# Patient Record
Sex: MALE | Race: BLACK OR AFRICAN AMERICAN | URBAN - METROPOLITAN AREA
[De-identification: names, ages, dates, MRNs, and addresses within clinical notes are randomized per-mention and may not be internally consistent; named-entity substitution may affect disease eponyms.]

---

## 2017-03-23 ENCOUNTER — HISTORICAL (OUTPATIENT)
Dept: LAB | Facility: HOSPITAL | Age: 67
End: 2017-03-23

## 2018-04-23 ENCOUNTER — HISTORICAL (OUTPATIENT)
Dept: LAB | Facility: HOSPITAL | Age: 68
End: 2018-04-23

## 2018-08-08 ENCOUNTER — HISTORICAL (OUTPATIENT)
Dept: RADIOLOGY | Facility: HOSPITAL | Age: 68
End: 2018-08-08

## 2018-08-15 ENCOUNTER — HISTORICAL (OUTPATIENT)
Dept: RADIOLOGY | Facility: HOSPITAL | Age: 68
End: 2018-08-15

## 2018-09-05 ENCOUNTER — HISTORICAL (OUTPATIENT)
Dept: RADIOLOGY | Facility: HOSPITAL | Age: 68
End: 2018-09-05

## 2018-10-08 ENCOUNTER — HISTORICAL (OUTPATIENT)
Dept: RADIOLOGY | Facility: HOSPITAL | Age: 68
End: 2018-10-08

## 2019-02-25 ENCOUNTER — HISTORICAL (OUTPATIENT)
Dept: LAB | Facility: HOSPITAL | Age: 69
End: 2019-02-25

## 2019-06-25 ENCOUNTER — HISTORICAL (OUTPATIENT)
Dept: LAB | Facility: HOSPITAL | Age: 69
End: 2019-06-25

## 2019-06-25 LAB
ABS NEUT (OLG): 3.75 X10(3)/MCL (ref 2.1–9.2)
ALBUMIN SERPL-MCNC: 3.6 GM/DL (ref 3.4–5)
ALP SERPL-CCNC: 116 UNIT/L (ref 46–116)
AST SERPL-CCNC: 19 UNIT/L (ref 15–37)
BASOPHILS # BLD AUTO: 0 X10(3)/MCL (ref 0–0.2)
BASOPHILS NFR BLD AUTO: 1 %
BILIRUB SERPL-MCNC: 0.4 MG/DL (ref 0.2–1)
BILIRUBIN DIRECT+TOT PNL SERPL-MCNC: 0.09 MG/DL (ref 0–0.2)
BILIRUBIN DIRECT+TOT PNL SERPL-MCNC: 0.31 MG/DL (ref 0–0.8)
BUN SERPL-MCNC: 16.4 MG/DL (ref 7–18)
CALCIUM SERPL-MCNC: 9.1 MG/DL (ref 8.5–10.1)
CHLORIDE SERPL-SCNC: 101 MMOL/L (ref 98–107)
CHOLEST SERPL-MCNC: 162 MG/DL (ref 0–200)
CHOLEST/HDLC SERPL: 4.6 {RATIO} (ref 0–5)
CO2 SERPL-SCNC: 28.3 MMOL/L (ref 21–32)
CREAT SERPL-MCNC: 1.39 MG/DL (ref 0.6–1.3)
EOSINOPHIL # BLD AUTO: 0.2 X10(3)/MCL (ref 0–0.9)
EOSINOPHIL NFR BLD AUTO: 2 %
ERYTHROCYTE [DISTWIDTH] IN BLOOD BY AUTOMATED COUNT: 14.5 % (ref 11.5–17)
GLOBULIN SER-MCNC: 4.5 GM/DL (ref 2.4–3.5)
GLUCOSE SERPL-MCNC: 98 MG/DL (ref 74–106)
HCT VFR BLD AUTO: 37.8 % (ref 42–52)
HDLC SERPL-MCNC: 35 MG/DL (ref 40–60)
HGB BLD-MCNC: 13 GM/DL (ref 14–18)
IMM GRANULOCYTES # BLD AUTO: 0.01 % (ref 0–0.02)
IMM GRANULOCYTES NFR BLD AUTO: 0.2 % (ref 0–0.43)
LDLC SERPL CALC-MCNC: 113 MG/DL (ref 0–129)
LYMPHOCYTES # BLD AUTO: 1.9 X10(3)/MCL (ref 0.6–4.6)
LYMPHOCYTES NFR BLD AUTO: 30 %
MCH RBC QN AUTO: 29.3 PG (ref 27–31)
MCHC RBC AUTO-ENTMCNC: 34.4 GM/DL (ref 33–36)
MCV RBC AUTO: 85.1 FL (ref 80–94)
MONOCYTES # BLD AUTO: 0.6 X10(3)/MCL (ref 0.1–1.3)
MONOCYTES NFR BLD AUTO: 10 %
NEUTROPHILS # BLD AUTO: 3.75 X10(3)/MCL (ref 1.4–7.9)
NEUTROPHILS NFR BLD AUTO: 58 %
PLATELET # BLD AUTO: 142 X10(3)/MCL (ref 130–400)
PLATELET # BLD EST: ADEQUATE 10*3/UL
PMV BLD AUTO: 11.4 FL (ref 9.4–12.4)
POTASSIUM SERPL-SCNC: 3.4 MMOL/L (ref 3.5–5.1)
PROT SERPL-MCNC: 8.1 GM/DL (ref 6.4–8.2)
RBC # BLD AUTO: 4.44 X10(6)/MCL (ref 4.7–6.1)
RBC MORPH BLD: NORMAL
SODIUM SERPL-SCNC: 138 MMOL/L (ref 136–145)
TRIGL SERPL-MCNC: 71 MG/DL
VLDLC SERPL CALC-MCNC: 14 MG/DL
WBC # SPEC AUTO: 6.5 X10(3)/MCL (ref 4.5–11.5)

## 2019-09-16 LAB — NONINV COLON CA DNA+OCC BLD SCRN STL QL: NEGATIVE

## 2020-03-03 ENCOUNTER — HISTORICAL (OUTPATIENT)
Dept: LAB | Facility: HOSPITAL | Age: 70
End: 2020-03-03

## 2020-08-24 ENCOUNTER — HISTORICAL (OUTPATIENT)
Dept: LAB | Facility: HOSPITAL | Age: 70
End: 2020-08-24

## 2020-09-14 ENCOUNTER — HISTORICAL (OUTPATIENT)
Dept: LAB | Facility: HOSPITAL | Age: 70
End: 2020-09-14

## 2021-03-15 ENCOUNTER — HISTORICAL (OUTPATIENT)
Dept: LAB | Facility: HOSPITAL | Age: 71
End: 2021-03-15

## 2021-03-15 LAB
ALBUMIN SERPL-MCNC: 3.5 GM/DL (ref 3.4–4.8)
ALBUMIN/GLOB SERPL: 0.9 RATIO (ref 1.1–2)
ALP SERPL-CCNC: 88 UNIT/L (ref 40–150)
ALT SERPL-CCNC: 20 UNIT/L (ref 0–55)
AST SERPL-CCNC: 19 UNIT/L (ref 5–34)
BILIRUB SERPL-MCNC: 0.4 MG/DL
BILIRUBIN DIRECT+TOT PNL SERPL-MCNC: 0.1 MG/DL (ref 0–0.5)
BILIRUBIN DIRECT+TOT PNL SERPL-MCNC: 0.3 MG/DL (ref 0–0.8)
BUN SERPL-MCNC: 14 MG/DL (ref 8.4–25.7)
CALCIUM SERPL-MCNC: 8.7 MG/DL (ref 8.8–10)
CHLORIDE SERPL-SCNC: 104 MMOL/L (ref 98–107)
CO2 SERPL-SCNC: 26 MMOL/L (ref 23–31)
CREAT SERPL-MCNC: 1.1 MG/DL (ref 0.73–1.18)
ERYTHROCYTE [DISTWIDTH] IN BLOOD BY AUTOMATED COUNT: 14.7 % (ref 11.5–17)
GLOBULIN SER-MCNC: 3.8 GM/DL (ref 2.4–3.5)
GLUCOSE SERPL-MCNC: 107 MG/DL (ref 82–115)
HCT VFR BLD AUTO: 38.1 % (ref 42–52)
HGB BLD-MCNC: 12.2 GM/DL (ref 14–18)
MCH RBC QN AUTO: 29.7 PG (ref 27–31)
MCHC RBC AUTO-ENTMCNC: 32 GM/DL (ref 33–36)
MCV RBC AUTO: 92.7 FL (ref 80–94)
PLATELET # BLD AUTO: 228 X10(3)/MCL (ref 130–400)
PMV BLD AUTO: 11.7 FL (ref 9.4–12.4)
POTASSIUM SERPL-SCNC: 3.1 MMOL/L (ref 3.5–5.1)
PROT SERPL-MCNC: 7.3 GM/DL (ref 5.8–7.6)
RBC # BLD AUTO: 4.11 X10(6)/MCL (ref 4.7–6.1)
SODIUM SERPL-SCNC: 142 MMOL/L (ref 136–145)
WBC # SPEC AUTO: 8.6 X10(3)/MCL (ref 4.5–11.5)

## 2022-04-07 ENCOUNTER — HISTORICAL (OUTPATIENT)
Dept: ADMINISTRATIVE | Facility: HOSPITAL | Age: 72
End: 2022-04-07
Payer: MEDICARE

## 2022-04-24 VITALS
WEIGHT: 190.56 LBS | DIASTOLIC BLOOD PRESSURE: 79 MMHG | BODY MASS INDEX: 28.88 KG/M2 | OXYGEN SATURATION: 97 % | HEIGHT: 68 IN | SYSTOLIC BLOOD PRESSURE: 145 MMHG

## 2022-05-23 ENCOUNTER — OFFICE VISIT (OUTPATIENT)
Dept: PRIMARY CARE CLINIC | Facility: CLINIC | Age: 72
End: 2022-05-23
Payer: MEDICARE

## 2022-05-23 VITALS
OXYGEN SATURATION: 98 % | BODY MASS INDEX: 33.46 KG/M2 | RESPIRATION RATE: 18 BRPM | HEIGHT: 64 IN | DIASTOLIC BLOOD PRESSURE: 81 MMHG | SYSTOLIC BLOOD PRESSURE: 144 MMHG | HEART RATE: 57 BPM | WEIGHT: 196 LBS | TEMPERATURE: 97 F

## 2022-05-23 DIAGNOSIS — I10 UNCONTROLLED HYPERTENSION: Primary | ICD-10-CM

## 2022-05-23 DIAGNOSIS — R54 FRAIL ELDERLY: ICD-10-CM

## 2022-05-23 DIAGNOSIS — R47.1 DYSARTHRIA: ICD-10-CM

## 2022-05-23 DIAGNOSIS — E87.6 HYPOKALEMIA: ICD-10-CM

## 2022-05-23 DIAGNOSIS — Z55.0 ILLITERACY: ICD-10-CM

## 2022-05-23 DIAGNOSIS — I69.952 SPASTIC HEMIPARESIS OF LEFT DOMINANT SIDE AS LATE EFFECT OF CEREBROVASCULAR DISEASE: ICD-10-CM

## 2022-05-23 PROBLEM — G81.10 SPASTIC HEMIPLEGIA: Status: ACTIVE | Noted: 2022-05-23

## 2022-05-23 PROBLEM — N18.9 CHRONIC RENAL IMPAIRMENT: Status: ACTIVE | Noted: 2022-05-23

## 2022-05-23 PROBLEM — Z72.0 CHEWS TOBACCO: Status: ACTIVE | Noted: 2022-05-23

## 2022-05-23 PROBLEM — Z91.199 NONCOMPLIANCE WITH TREATMENT REGIMEN: Status: ACTIVE | Noted: 2022-05-23

## 2022-05-23 PROBLEM — R63.4 ABNORMAL WEIGHT LOSS: Status: ACTIVE | Noted: 2022-05-23

## 2022-05-23 PROCEDURE — 80048 BASIC METABOLIC PNL TOTAL CA: CPT | Performed by: INTERNAL MEDICINE

## 2022-05-23 PROCEDURE — 99213 OFFICE O/P EST LOW 20 MIN: CPT | Mod: ,,, | Performed by: INTERNAL MEDICINE

## 2022-05-23 PROCEDURE — 99213 PR OFFICE/OUTPT VISIT, EST, LEVL III, 20-29 MIN: ICD-10-PCS | Mod: ,,, | Performed by: INTERNAL MEDICINE

## 2022-05-23 PROCEDURE — 36415 COLL VENOUS BLD VENIPUNCTURE: CPT | Performed by: INTERNAL MEDICINE

## 2022-05-23 RX ORDER — POTASSIUM CHLORIDE 20 MEQ/15ML
20 SOLUTION ORAL 2 TIMES DAILY
Qty: 900 ML | Refills: 2 | Status: SHIPPED | OUTPATIENT
Start: 2022-05-23 | End: 2022-08-21

## 2022-05-23 RX ORDER — HYDROCHLOROTHIAZIDE 25 MG/1
25 TABLET ORAL DAILY
COMMUNITY
Start: 2021-12-08 | End: 2022-11-16 | Stop reason: SDUPTHER

## 2022-05-23 RX ORDER — MONTELUKAST SODIUM 10 MG/1
10 TABLET ORAL DAILY
COMMUNITY
Start: 2021-12-08 | End: 2023-01-12

## 2022-05-23 RX ORDER — POTASSIUM CHLORIDE 20 MEQ/1
20 TABLET, EXTENDED RELEASE ORAL SEE ADMIN INSTRUCTIONS
COMMUNITY
End: 2022-05-23 | Stop reason: SDDI

## 2022-05-23 RX ORDER — ALBUTEROL SULFATE 90 UG/1
AEROSOL, METERED RESPIRATORY (INHALATION)
COMMUNITY
Start: 2021-12-08 | End: 2023-04-13 | Stop reason: ALTCHOICE

## 2022-05-23 RX ORDER — CARVEDILOL 12.5 MG/1
12.5 TABLET ORAL 2 TIMES DAILY WITH MEALS
COMMUNITY
End: 2022-11-16 | Stop reason: SDUPTHER

## 2022-05-23 RX ORDER — PRAVASTATIN SODIUM 20 MG/1
20 TABLET ORAL DAILY
COMMUNITY
Start: 2021-12-08 | End: 2023-04-13 | Stop reason: SDUPTHER

## 2022-05-23 RX ORDER — AMLODIPINE BESYLATE 10 MG/1
10 TABLET ORAL DAILY
COMMUNITY
Start: 2021-12-08 | End: 2023-03-01

## 2022-05-23 SDOH — SOCIAL DETERMINANTS OF HEALTH (SDOH): ILITERACY AND LOW LEVEL LITERACY: Z55.0

## 2022-05-23 NOTE — PROGRESS NOTES
Rosemary Arnold MD   [address] [place] [address]     PATIENT NAME: Cody Agustin  : 1950  DATE: 22  MRN: [id number]      Billing Provider: Rosemary Arnold MD  Level of Service:   Patient PCP Information     Provider PCP Type    Rosemary Arnold MD General          Reason for Visit / Chief Complaint: Follow-up       Update PCP  Update Chief Complaint         History of Present Illness / Problem Focused Workflow     Cody Agustin presents to the clinic with Follow-up     72 year old with HTN, CVA with left hemiparesis, CKD2-3 and illiteracy. He reports he is doing fine. He wants his K changed, the pills are too big, he reports he takes it once a day, rarely twice although it's ordered TID MWF and BID the other days. He reports doing his booster for Covid.       Review of Systems     Review of Systems   Constitutional: Negative.    HENT: Negative.    Respiratory: Negative.    Cardiovascular: Negative.    Endocrine: Negative.    Genitourinary: Negative.    Neurological: Positive for speech difficulty and weakness. Negative for dizziness, light-headedness and headaches.        Fell last week, just tripped, denies pain       Medical / Social / Family History     Past Medical History:   Diagnosis Date    Chronic renal failure     CVA (cerebral vascular accident)     HTN (hypertension)     Illiterate     Left spastic hemiplegia        Past Surgical History:   Procedure Laterality Date    FOOT ARTHRODESIS W/ OLGA-BRANCHEAU ARTHROEREISIS IMPLANT Right 2015       Social History  Mr. Agustin      reports that he has never smoked. His smokeless tobacco use includes snuff.    Family History  Mr.'s Agustin   family history includes Diabetes in his sister; Heart attack in his father; Heart failure in his brother, brother, and brother; Hypertension in his brother; Kidney failure in his sister; Stroke in his brother and mother.    Medications and Allergies     Medications  Outpatient Medications Marked as  Taking for the 5/23/22 encounter (Office Visit) with Rosemary Arnold MD   Medication Sig Dispense Refill    albuterol (PROVENTIL/VENTOLIN HFA) 90 mcg/actuation inhaler Inhale into the lungs.      amLODIPine (NORVASC) 10 MG tablet Take 10 mg by mouth once daily.      carvediloL (COREG) 12.5 MG tablet Take 12.5 mg by mouth 2 (two) times daily with meals.      hydroCHLOROthiazide (HYDRODIURIL) 25 MG tablet Take 25 mg by mouth once daily.      montelukast (SINGULAIR) 10 mg tablet Take 10 mg by mouth once daily.      pravastatin (PRAVACHOL) 20 MG tablet Take 20 mg by mouth once daily at 6am.      [DISCONTINUED] potassium chloride SA (K-DUR,KLOR-CON) 20 MEQ tablet Take 20 mEq by mouth As instructed. TID on MWF, BID on other days         Allergies  Review of patient's allergies indicates:  No Known Allergies    Physical Examination     Vitals:    05/23/22 1439   BP: (!) 144/81   Pulse: (!) 57   Resp: 18   Temp: 97.2 °F (36.2 °C)     Physical Exam  Vitals reviewed.   HENT:      Head: Normocephalic.      Right Ear: Tympanic membrane normal.      Left Ear: Tympanic membrane normal.   Eyes:      Conjunctiva/sclera: Conjunctivae normal.      Pupils: Pupils are equal, round, and reactive to light.   Cardiovascular:      Rate and Rhythm: Regular rhythm. Bradycardia present.      Heart sounds: Normal heart sounds.     No gallop.   Pulmonary:      Effort: Pulmonary effort is normal.      Breath sounds: Normal breath sounds.   Chest:      Chest wall: No tenderness.   Abdominal:      General: Abdomen is flat.      Palpations: Abdomen is soft.      Tenderness: There is no abdominal tenderness. There is no rebound.   Musculoskeletal:         General: Deformity present.      Cervical back: Neck supple. No tenderness.      Comments: Contracture left wrist and elbow   Skin:     General: Skin is warm and dry.   Neurological:      Mental Status: He is alert and oriented to person, place, and time.      Comments: Speech stable, there  is dense spastic left arm hemiparesis, left leg partial paralysis, walking with 4prong cane.            Assessment and Plan (including Health Maintenance)      Problem List  Smart Sets  Document Outside HM   :    Plan:         Health Maintenance Due   Topic Date Due    Hepatitis C Screening  Never done    TETANUS VACCINE  Never done    Colorectal Cancer Screening  Never done    Shingles Vaccine (1 of 2) Never done    Abdominal Aortic Aneurysm Screening  Never done    COVID-19 Vaccine (3 - Booster for Moderna series) 09/27/2021       Problem List Items Addressed This Visit        Renal/    Hypokalemia    Relevant Medications    potassium chloride 10% (KAYCIEL) 20 mEq/15 mL oral solution    Other Relevant Orders    Basic Metabolic Panel      Other Visit Diagnoses     Uncontrolled hypertension    -  Primary    Medical compliance is an issue and chronically hypokalemic affecting medications return 3 mo to see if liquid K gets him to normal levels.     Relevant Medications    potassium chloride 10% (KAYCIEL) 20 mEq/15 mL oral solution    Other Relevant Orders    Basic Metabolic Panel    Spastic hemiparesis of left dominant side as late effect of cerebrovascular disease        Does remarkably well for level of paralysis he has    Illiteracy        Frail elderly        Dysarthria              Health Maintenance Topics with due status: Not Due       Topic Last Completion Date    Lipid Panel 06/25/2019   Lipid was done in Dec, Cer[employer] merge did not happen    No future appointments.         Signature:  Rosemary Arnold MD  [facility]  [id number] [place] [id number]    Date of encounter: 5/23/22

## 2022-05-24 ENCOUNTER — TELEPHONE (OUTPATIENT)
Dept: PRIMARY CARE CLINIC | Facility: CLINIC | Age: 72
End: 2022-05-24
Payer: MEDICARE

## 2022-05-24 LAB
ANION GAP SERPL CALC-SCNC: 10 MEQ/L
BUN SERPL-MCNC: 22.7 MG/DL (ref 8.4–25.7)
CALCIUM SERPL-MCNC: 8.7 MG/DL (ref 8.8–10)
CHLORIDE SERPL-SCNC: 102 MMOL/L (ref 98–107)
CO2 SERPL-SCNC: 24 MMOL/L (ref 23–31)
CREAT SERPL-MCNC: 1.18 MG/DL (ref 0.73–1.18)
CREAT/UREA NIT SERPL: 19
GLUCOSE SERPL-MCNC: 107 MG/DL (ref 82–115)
POTASSIUM SERPL-SCNC: 4 MMOL/L (ref 3.5–5.1)
SODIUM SERPL-SCNC: 136 MMOL/L (ref 136–145)

## 2022-05-24 NOTE — TELEPHONE ENCOUNTER
----- Message from Rosemary Arnold MD sent at 5/24/2022  1:14 PM CDT -----  His potassium was ok and he's not usually taking the pill more than once a day so if he takes the liquid once a day he is probably ok.  His sugar was good and kidney was too.  The calcium is low so drink more milk.

## 2022-08-02 PROBLEM — I10 ESSENTIAL HYPERTENSION: Status: ACTIVE | Noted: 2022-08-02

## 2022-08-08 PROBLEM — Z55.0 ILLITERACY: Status: ACTIVE | Noted: 2022-08-08

## 2022-08-08 PROBLEM — R60.0 EDEMA LEG: Status: ACTIVE | Noted: 2022-08-08

## 2022-08-08 PROBLEM — N18.31 STAGE 3A CHRONIC KIDNEY DISEASE: Status: ACTIVE | Noted: 2022-08-08

## 2022-08-08 PROBLEM — I69.354 HEMIPARESIS OF LEFT NONDOMINANT SIDE AS LATE EFFECT OF CEREBRAL INFARCTION: Status: ACTIVE | Noted: 2022-08-08

## 2022-08-22 ENCOUNTER — HOSPITAL ENCOUNTER (EMERGENCY)
Facility: HOSPITAL | Age: 72
Discharge: HOME | End: 2022-08-22
Attending: EMERGENCY MEDICINE
Payer: MEDICARE

## 2022-08-22 VITALS
DIASTOLIC BLOOD PRESSURE: 92 MMHG | RESPIRATION RATE: 18 BRPM | OXYGEN SATURATION: 99 % | HEIGHT: 64 IN | BODY MASS INDEX: 32.78 KG/M2 | WEIGHT: 192 LBS | HEART RATE: 59 BPM | TEMPERATURE: 98 F | SYSTOLIC BLOOD PRESSURE: 180 MMHG

## 2022-08-22 DIAGNOSIS — G60.9 IDIOPATHIC PERIPHERAL NEUROPATHY: Primary | ICD-10-CM

## 2022-08-22 PROCEDURE — 99283 EMERGENCY DEPT VISIT LOW MDM: CPT

## 2022-08-22 PROCEDURE — 25000003 PHARM REV CODE 250: Performed by: EMERGENCY MEDICINE

## 2022-08-22 RX ORDER — GABAPENTIN 300 MG/1
300 CAPSULE ORAL ONCE
Status: COMPLETED | OUTPATIENT
Start: 2022-08-22 | End: 2022-08-22

## 2022-08-22 RX ORDER — GABAPENTIN 300 MG/1
300 CAPSULE ORAL 3 TIMES DAILY
Qty: 90 CAPSULE | Refills: 11 | Status: SHIPPED | OUTPATIENT
Start: 2022-08-22 | End: 2022-08-24 | Stop reason: DRUGHIGH

## 2022-08-22 RX ADMIN — GABAPENTIN 300 MG: 300 CAPSULE ORAL at 04:08

## 2022-08-22 NOTE — ED NOTES
Pt arrived to ED with family c/o pain to bottom of left foot. Stated pain is mostly from the arch to heel and rates it 10/10. Pt has hx of stroke that affected the same side, stroke 14 years ago. Has taken Ibuprofen, last dose on Thursday of last week.

## 2022-08-22 NOTE — ED PROVIDER NOTES
Encounter Date: 8/22/2022       History     Chief Complaint   Patient presents with    Foot Pain     Pt presents with tingling sensation to left foot; onset 1wk ago; denies any injury; hx of stroke with left side affected; reports intermittent swelling      This 71 y/o man presents with c/o Tingling sensation in his foot. He cannot bear weight on it and feels like pins and needles when he does.He has had a CVA affecting that side in the past with left hemiplegia. He also has some intermittent swelling of the foot.         Review of patient's allergies indicates:  No Known Allergies  Past Medical History:   Diagnosis Date    Chronic renal failure     CVA (cerebral vascular accident)     HTN (hypertension)     Illiterate     Left spastic hemiplegia      Past Surgical History:   Procedure Laterality Date    FOOT ARTHRODESIS W/ OLGA-KASEY ARTHROEREISIS IMPLANT Right 08/13/2015     Family History   Problem Relation Age of Onset    Stroke Mother     Heart attack Father     Diabetes Sister     Kidney failure Sister     Heart failure Brother     Hypertension Brother     Stroke Brother     Heart failure Brother     Heart failure Brother      Social History     Tobacco Use    Smoking status: Never Smoker    Smokeless tobacco: Current User     Types: Snuff     Review of Systems   Constitutional: Negative for fever.   HENT: Negative for sore throat.    Respiratory: Negative for shortness of breath.    Cardiovascular: Negative for chest pain.   Gastrointestinal: Negative for nausea.   Genitourinary: Negative for dysuria.   Musculoskeletal: Negative for back pain.   Skin: Negative for rash.   Neurological: Negative for weakness.        Paresthesias in the left foot   Hematological: Does not bruise/bleed easily.       Physical Exam     Initial Vitals [08/22/22 1539]   BP Pulse Resp Temp SpO2   (!) 180/92 (!) 59 18 98.3 °F (36.8 °C) 99 %      MAP       --         Physical Exam    Constitutional: He appears  well-developed and well-nourished.   HENT:   Head: Normocephalic and atraumatic.   Mouth/Throat: Mucous membranes are normal.   Eyes: EOM are normal. Pupils are equal, round, and reactive to light.   Neck: Neck supple.   Normal range of motion.  Cardiovascular: Normal rate, regular rhythm, normal heart sounds and intact distal pulses.   Pulmonary/Chest: Breath sounds normal.   Abdominal: Abdomen is soft. Bowel sounds are normal.   Musculoskeletal:         General: Normal range of motion.      Cervical back: Normal range of motion and neck supple.     Neurological: He is alert and oriented to person, place, and time.   Left hemiplegia   Skin: Skin is warm and dry. Capillary refill takes less than 2 seconds.   The toenails need trimming but there is no sign of infection or arthropathy.    Psychiatric: He has a normal mood and affect. His behavior is normal. Judgment and thought content normal.         ED Course   Procedures  Labs Reviewed - No data to display       Imaging Results    None          Medications - No data to display                       Clinical Impression:   Final diagnoses:  [G60.9] Idiopathic peripheral neuropathy (Primary)          ED Disposition Condition    Discharge Stable        ED Prescriptions     Medication Sig Dispense Start Date End Date Auth. Provider    gabapentin (NEURONTIN) 300 MG capsule Take 1 capsule (300 mg total) by mouth 3 (three) times daily. 90 capsule 8/22/2022 8/22/2023 Isidro Martínez MD        Follow-up Information     Follow up With Specialties Details Why Contact Info    Rosemary Arnold MD Internal Medicine  keep appointment scheduled in 2 days. [address] [facility] [address]  [phone]             Isidro Martínez MD  08/22/22 2250

## 2022-08-24 ENCOUNTER — OFFICE VISIT (OUTPATIENT)
Dept: PRIMARY CARE CLINIC | Facility: CLINIC | Age: 72
End: 2022-08-24
Payer: MEDICARE

## 2022-08-24 ENCOUNTER — TELEPHONE (OUTPATIENT)
Dept: PRIMARY CARE CLINIC | Facility: CLINIC | Age: 72
End: 2022-08-24

## 2022-08-24 VITALS
BODY MASS INDEX: 39.27 KG/M2 | WEIGHT: 200 LBS | OXYGEN SATURATION: 97 % | RESPIRATION RATE: 16 BRPM | DIASTOLIC BLOOD PRESSURE: 71 MMHG | HEART RATE: 63 BPM | TEMPERATURE: 98 F | SYSTOLIC BLOOD PRESSURE: 131 MMHG | HEIGHT: 60 IN

## 2022-08-24 DIAGNOSIS — M79.672 LEFT FOOT PAIN: Primary | ICD-10-CM

## 2022-08-24 DIAGNOSIS — R54 FRAIL ELDERLY: ICD-10-CM

## 2022-08-24 DIAGNOSIS — I69.922 DYSARTHRIA AS LATE EFFECT OF CEREBROVASCULAR DISEASE: ICD-10-CM

## 2022-08-24 DIAGNOSIS — R60.0 PEDAL EDEMA: ICD-10-CM

## 2022-08-24 DIAGNOSIS — Z55.0 ILLITERATE: ICD-10-CM

## 2022-08-24 DIAGNOSIS — I69.354 SPASTIC HEMIPARESIS OF LEFT NONDOMINANT SIDE AS LATE EFFECT OF CEREBRAL INFARCTION: ICD-10-CM

## 2022-08-24 DIAGNOSIS — I10 ESSENTIAL HYPERTENSION: ICD-10-CM

## 2022-08-24 PROCEDURE — 99213 OFFICE O/P EST LOW 20 MIN: CPT | Mod: ,,, | Performed by: INTERNAL MEDICINE

## 2022-08-24 PROCEDURE — 99213 PR OFFICE/OUTPT VISIT, EST, LEVL III, 20-29 MIN: ICD-10-PCS | Mod: ,,, | Performed by: INTERNAL MEDICINE

## 2022-08-24 RX ORDER — GABAPENTIN 600 MG/1
600 TABLET ORAL 3 TIMES DAILY
Qty: 90 TABLET | Refills: 5 | Status: SHIPPED | OUTPATIENT
Start: 2022-08-24 | End: 2023-04-13 | Stop reason: SDUPTHER

## 2022-08-24 SDOH — SOCIAL DETERMINANTS OF HEALTH (SDOH): ILITERACY AND LOW LEVEL LITERACY: Z55.0

## 2022-08-24 NOTE — TELEPHONE ENCOUNTER
----- Message from Ashley Armstrong sent at 8/24/2022  2:37 PM CDT -----  Regarding: Med Advice  Type:  Patient Returning Call    Who Called: pt  Who Left Message for Patient: for nurse  Does the patient know what this is regarding?: yes  Would the patient rather a call back or a response via [facility]? Call back  Best Call Back Number: [phone]  Additional Information: pt's daughter was calling to check on her dad, she was unable to bring him to his appt and wants to know if he was okay. She also wanted to know why he had so many appts and why it was listed at 4:30 but was told to come in at 1:30... and she needed to ask Dr. Arnold to write a prescription to get him a Transport Chair from the [facility] door.. she says she called already and they accept his insurance for it but just needed the sign off...

## 2022-08-24 NOTE — PROGRESS NOTES
Rosemary Arnold MD   [address] [place] [address]     PATIENT NAME: Cody Agustin  : 1950  DATE: 22  MRN: [id number]      Billing Provider: Rosemary Arnold MD  Level of Service:   Patient PCP Information     Provider PCP Type    Rosemary Arnold MD General          Reason for Visit / Chief Complaint: ER follow up for laeft foot pain       Update PCP  Update Chief Complaint         History of Present Illness / Problem Focused Workflow     Cody Agustin presents to the clinic with ER follow up for laeft foot pain     He is here to follow up his ER visit with foot pain. He notes he got a new Medicare Card, he has had multiple  in our system and in pharmacy He says he is not 72 he is 71 although I believe our  was through his Medicare. He shows me a new card, it is from [employer] now. He went to the ER with foot/leg pain and was diagnosed with neuropathy. He was given gabapentin. The pain is sticking pain under his foot. He is also having pain in his left ankle. The gabapentin has helped some but he thinks it needs to be stronger. He is not tired with it and no reports of falls. He is also scheduled to see the cardiologist this week. I had ordered an ECHO on his visit last year but I don't see any reports here or in the old chart. He also had some chest pain reported and SOB with exertion but had missed his appointment with [facility]. He also missed two follow up visits from the ER here.   He is having more trouble walking, he has the spastic hemiparesis on the left and uses a quad cane but he's tiring quickly now and gets winded. His family is asking for a transport chair to assist in getting him to his appointments. With his left arm hemiparesis he cannot use a standard and he has dyspnea with exertion that is under evaluation with cardiology.       Review of Systems     Review of Systems   Constitutional: Positive for fatigue.   Respiratory: Positive for shortness of breath.    Cardiovascular: Positive  for leg swelling.   Gastrointestinal: Negative.    Genitourinary: Negative.    Musculoskeletal: Positive for arthralgias, gait problem, joint swelling and myalgias.        Mostly his left leg that had weakness already from his stroke.   Neurological: Positive for weakness.        Left leg is weak.        Medical / Social / Family History     Past Medical History:   Diagnosis Date    Chronic renal failure     CVA (cerebral vascular accident)     HTN (hypertension)     Illiterate     Left spastic hemiplegia        Past Surgical History:   Procedure Laterality Date    FOOT ARTHRODESIS W/ OLGA-BRANCHSEKOUU ARTHROEREISIS IMPLANT Right 08/13/2015       Social History  Mr. Agustin      reports that he has never smoked. His smokeless tobacco use includes snuff.    Family History  Mr.'s Agustin   family history includes Diabetes in his sister; Heart attack in his father; Heart failure in his brother, brother, and brother; Hypertension in his brother; Kidney failure in his sister; Stroke in his brother and mother.    Medications and Allergies     Medications  Outpatient Medications Marked as Taking for the 8/24/22 encounter (Office Visit) with Rosemary Arnold MD   Medication Sig Dispense Refill    amLODIPine (NORVASC) 10 MG tablet Take 10 mg by mouth once daily.      carvediloL (COREG) 12.5 MG tablet Take 12.5 mg by mouth 2 (two) times daily with meals.      hydroCHLOROthiazide (HYDRODIURIL) 25 MG tablet Take 25 mg by mouth once daily.      montelukast (SINGULAIR) 10 mg tablet Take 10 mg by mouth once daily.      potassium chloride (KLOR-CON M20 ORAL) Take 20 mEq by mouth. 1 BID DAILY  WITH EXTRA DOSE 3 TIMES ADAY ON Wednesday AND SATURDAY      pravastatin (PRAVACHOL) 20 MG tablet Take 20 mg by mouth once daily at 6am.      [DISCONTINUED] gabapentin (NEURONTIN) 300 MG capsule Take 1 capsule (300 mg total) by mouth 3 (three) times daily. 90 capsule 11       Allergies  Review of patient's allergies indicates:  No  Known Allergies    Physical Examination     Vitals:    08/24/22 1402   BP: 131/71   Pulse: 63   Resp: 16   Temp: 98.1 °F (36.7 °C)     Physical Exam  Vitals reviewed.   Constitutional:       General: He is not in acute distress.     Appearance: He is ill-appearing.   Cardiovascular:      Rate and Rhythm: Normal rate and regular rhythm.      Heart sounds: Normal heart sounds.      Comments: 3+ edema, pulse is decreased on left foot but it is warm to touch  Abdominal:      General: Bowel sounds are normal.      Palpations: Abdomen is soft.   Musculoskeletal:      Right lower leg: Edema present.      Left lower leg: Edema present.      Comments: L>R, no Fatimah's, 3+ to lower calf   Skin:     Comments: Decreased hair on the calves   Neurological:      Mental Status: He is alert.           Assessment and Plan (including Health Maintenance)      Problem List  Smart Sets  Document Outside HM   :    Plan:   Foot pain, it is his weak leg, will try increasing gabapentin since he's taking regularly and it helps some. He is due to see a heart doctor this week and the pulse is decreased there, hopefully he'll get a full evaluation with carotids and ECHO.  He would benefit from a transport chair to use to move around in house and for doctor appointments outside. He has spastic hemiparesis and the pain in that leg is increasing his difficulty walking, he has a quad cane but with his arm paralysis has not been able to maneuver a walker. He has decreased endurance with dyspnea on exertion that is under evaluation with cardiology.   Foot edema evaluation with cardiology. With past CVA hopefully he'll also get check on carotids.   Decreased pulse may be from edema but I'd like him to check US with cardiology as well.       Health Maintenance Due   Topic Date Due    Hepatitis C Screening  Never done    TETANUS VACCINE  Never done    High Dose Statin  Never done    Colorectal Cancer Screening  Never done    Shingles Vaccine (1 of 2)  Never done    Abdominal Aortic Aneurysm Screening  Never done    COVID-19 Vaccine (4 - Booster for Moderna series) 09/05/2022       Problem List Items Addressed This Visit        Neuro    Spastic hemiparesis of left nondominant side as late effect of cerebral infarction    Relevant Orders    WHEELCHAIR FOR HOME USE       Cardiac/Vascular    Essential hypertension    Relevant Orders    Comprehensive Metabolic Panel    Lipid Panel       Other    Illiterate      Other Visit Diagnoses     Left foot pain    -  Primary    Pedal edema        Relevant Orders    Comprehensive Metabolic Panel    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

## 2022-08-24 NOTE — TELEPHONE ENCOUNTER
I've been seeing him every 3-6 months because his blood pressure was not controlled, this visit was follow up from his ER visit for the foot pain to assess if the medication was working. He also is to see a heart specialist for the shortness of breath and swelling. I have been trying to get that scheduled for over a year but he kept missing his visits. They will need to do testing so I'd expect more visits with them.     I needed to know about the transport chair but I was able to go back and do the documentation so an order should have printed to fax.

## 2022-08-25 ENCOUNTER — TELEPHONE (OUTPATIENT)
Dept: PRIMARY CARE CLINIC | Facility: CLINIC | Age: 72
End: 2022-08-25

## 2022-08-25 NOTE — TELEPHONE ENCOUNTER
----- Message from Odilon Macias sent at 8/25/2022  2:16 PM CDT -----  Regarding: Patient Call  Type:  Patient Returning Call    Who Called: pt  Who Left Message for Patient: nurse  Does the patient know what this is regarding?: yes  Would the patient rather a call back or a response via [facility]?  Call back  Best Call Back Number: [phone]  Additional Information:  [facility] pharmacy returning a phone call to Molly

## 2022-08-25 NOTE — TELEPHONE ENCOUNTER
I'm pretty sure we sent him to [facility] and they take pretty much every insurance, what they were asking for is probably his copay for a procedure, we could refer him to [facility].

## 2022-08-25 NOTE — TELEPHONE ENCOUNTER
----- Message from Ashley Armstrong sent at 8/25/2022 10:52 AM CDT -----  Regarding: Fax Request  Type:  Patient Returning Call    Who Called: pt's daughterDiana  Who Left Message for Patient: for nurse  Does the patient know what this is regarding?: yes  Would the patient rather a call back or a response via [facility]? Call back  Best Call Back Number: [phone]  Additional Information: pt's daughter was calling about the fax for her dad's transport chair.. she called [employer] and they said they never received it.. called backline 2x

## 2022-08-30 ENCOUNTER — DOCUMENTATION ONLY (OUTPATIENT)
Dept: ADMINISTRATIVE | Facility: HOSPITAL | Age: 72
End: 2022-08-30
Payer: MEDICARE

## 2022-09-04 ENCOUNTER — HOSPITAL ENCOUNTER (EMERGENCY)
Facility: HOSPITAL | Age: 72
Discharge: HOME | End: 2022-09-04
Attending: EMERGENCY MEDICINE
Payer: MEDICARE

## 2022-09-04 VITALS
DIASTOLIC BLOOD PRESSURE: 79 MMHG | TEMPERATURE: 98 F | SYSTOLIC BLOOD PRESSURE: 136 MMHG | HEART RATE: 64 BPM | RESPIRATION RATE: 15 BRPM | HEIGHT: 60 IN | OXYGEN SATURATION: 98 % | WEIGHT: 200 LBS | BODY MASS INDEX: 39.27 KG/M2

## 2022-09-04 DIAGNOSIS — R60.0 BILATERAL LOWER EXTREMITY EDEMA: ICD-10-CM

## 2022-09-04 DIAGNOSIS — M79.673 FOOT PAIN: ICD-10-CM

## 2022-09-04 DIAGNOSIS — R60.9 DEPENDENT EDEMA: Primary | ICD-10-CM

## 2022-09-04 DIAGNOSIS — M79.672 LEFT FOOT PAIN: ICD-10-CM

## 2022-09-04 LAB
ALBUMIN SERPL-MCNC: 3.6 GM/DL (ref 3.4–4.8)
ALBUMIN/GLOB SERPL: 0.8 RATIO (ref 1.1–2)
ALP SERPL-CCNC: 107 UNIT/L (ref 40–150)
ALT SERPL-CCNC: 18 UNIT/L (ref 0–55)
AST SERPL-CCNC: 23 UNIT/L (ref 5–34)
BASOPHILS # BLD AUTO: 0.04 X10(3)/MCL (ref 0–0.2)
BASOPHILS NFR BLD AUTO: 0.5 %
BILIRUBIN DIRECT+TOT PNL SERPL-MCNC: 0.5 MG/DL
BNP BLD-MCNC: 36.8 PG/ML
BUN SERPL-MCNC: 21.7 MG/DL (ref 8.4–25.7)
CALCIUM SERPL-MCNC: 9.3 MG/DL (ref 8.8–10)
CHLORIDE SERPL-SCNC: 103 MMOL/L (ref 98–107)
CO2 SERPL-SCNC: 23 MMOL/L (ref 23–31)
CREAT SERPL-MCNC: 1.57 MG/DL (ref 0.73–1.18)
EOSINOPHIL # BLD AUTO: 0.5 X10(3)/MCL (ref 0–0.9)
EOSINOPHIL NFR BLD AUTO: 6.8 %
ERYTHROCYTE [DISTWIDTH] IN BLOOD BY AUTOMATED COUNT: 14.2 % (ref 11.5–17)
FLUAV AG UPPER RESP QL IA.RAPID: NOT DETECTED
FLUBV AG UPPER RESP QL IA.RAPID: NOT DETECTED
GFR SERPLBLD CREATININE-BSD FMLA CKD-EPI: 46 MLS/MIN/1.73/M2
GLOBULIN SER-MCNC: 4.7 GM/DL (ref 2.4–3.5)
GLUCOSE SERPL-MCNC: 88 MG/DL (ref 82–115)
HCT VFR BLD AUTO: 41.6 % (ref 42–52)
HGB BLD-MCNC: 13.6 GM/DL (ref 14–18)
LYMPHOCYTES # BLD AUTO: 1.99 X10(3)/MCL (ref 0.6–4.6)
LYMPHOCYTES NFR BLD AUTO: 27.3 %
MCH RBC QN AUTO: 29.3 PG (ref 27–31)
MCHC RBC AUTO-ENTMCNC: 32.7 MG/DL (ref 33–36)
MCV RBC AUTO: 89.7 FL (ref 80–94)
MONOCYTES # BLD AUTO: 0.65 X10(3)/MCL (ref 0.1–1.3)
MONOCYTES NFR BLD AUTO: 8.9 %
NEUTROPHILS # BLD AUTO: 4.1 X10(3)/MCL (ref 2.1–9.2)
NEUTROPHILS NFR BLD AUTO: 56.4 %
PLATELET # BLD AUTO: 225 X10(3)/MCL (ref 130–400)
PMV BLD AUTO: 11 FL (ref 7.4–10.4)
POC CARDIAC TROPONIN I: 0.06 NG/ML
POTASSIUM SERPL-SCNC: 3.6 MMOL/L (ref 3.5–5.1)
PROT SERPL-MCNC: 8.3 GM/DL (ref 5.8–7.6)
RBC # BLD AUTO: 4.64 X10(6)/MCL (ref 4.7–6.1)
SAMPLE: NORMAL
SARS-COV-2 RNA RESP QL NAA+PROBE: NOT DETECTED
SODIUM SERPL-SCNC: 139 MMOL/L (ref 136–145)
WBC # SPEC AUTO: 7.3 X10(3)/MCL (ref 4.5–11.5)

## 2022-09-04 PROCEDURE — 84484 ASSAY OF TROPONIN QUANT: CPT

## 2022-09-04 PROCEDURE — 93005 ELECTROCARDIOGRAM TRACING: CPT

## 2022-09-04 PROCEDURE — 80053 COMPREHEN METABOLIC PANEL: CPT | Performed by: EMERGENCY MEDICINE

## 2022-09-04 PROCEDURE — 85025 COMPLETE CBC W/AUTO DIFF WBC: CPT | Performed by: EMERGENCY MEDICINE

## 2022-09-04 PROCEDURE — 93010 EKG 12-LEAD: ICD-10-PCS | Mod: ,,, | Performed by: INTERNAL MEDICINE

## 2022-09-04 PROCEDURE — 99285 EMERGENCY DEPT VISIT HI MDM: CPT | Mod: 25,CS

## 2022-09-04 PROCEDURE — 93010 ELECTROCARDIOGRAM REPORT: CPT | Mod: ,,, | Performed by: INTERNAL MEDICINE

## 2022-09-04 PROCEDURE — 36415 COLL VENOUS BLD VENIPUNCTURE: CPT | Performed by: EMERGENCY MEDICINE

## 2022-09-04 PROCEDURE — 83880 ASSAY OF NATRIURETIC PEPTIDE: CPT | Performed by: EMERGENCY MEDICINE

## 2022-09-04 PROCEDURE — 87636 SARSCOV2 & INF A&B AMP PRB: CPT | Performed by: EMERGENCY MEDICINE

## 2022-09-04 NOTE — ED PROVIDER NOTES
Encounter Date: 9/4/2022       History     Chief Complaint   Patient presents with    Foot Pain     Pt presents with left foot pain, seen here 2wks for same problem and dx with neuropathy; pt states medication is not working; has had a few falls due to foot pain being so severe, last fall yesterday; pt states he cannot walk on foot and also c/o swelling      This 73 y/o man presents with pain and swelling of his left foot. I saw him in the ER for this a few weeks ago and felt his symptoms most likely represented neuropathy (pins and needles). I started him on gabapentin. He was seen by his internist who agreed and increased the gabapentin. His family member states he has been having a harder and harder time trying to walk on the foot and it has become more swollen. She states he has had several falls. He describes the pain as needles and points to the top of the foot.     Review of patient's allergies indicates:  No Known Allergies  Past Medical History:   Diagnosis Date    Chronic renal failure     CVA (cerebral vascular accident)     HTN (hypertension)     Illiterate     Left spastic hemiplegia      Past Surgical History:   Procedure Laterality Date    FOOT ARTHRODESIS W/ [place] ARTHROEREISIS IMPLANT Right 08/13/2015     Family History   Problem Relation Age of Onset    Stroke Mother     Heart attack Father     Diabetes Sister     Kidney failure Sister     Heart failure Brother     Hypertension Brother     Stroke Brother     Heart failure Brother     Heart failure Brother      Social History     Tobacco Use    Smoking status: Never    Smokeless tobacco: Current     Types: Snuff     Review of Systems   Constitutional:  Negative for fever.   HENT:  Negative for sore throat.    Respiratory:  Negative for shortness of breath.    Cardiovascular:  Positive for leg swelling (both lower extremities). Negative for chest pain.   Gastrointestinal:  Negative for nausea.   Genitourinary:  Negative for dysuria.    Musculoskeletal:  Negative for back pain.   Skin:  Negative for rash.   Neurological:  Positive for numbness. Negative for weakness.   Hematological:  Does not bruise/bleed easily.     Physical Exam     Initial Vitals [09/04/22 1332]   BP Pulse Resp Temp SpO2   129/82 61 18 97.6 °F (36.4 °C) 98 %      MAP       --         Physical Exam    Nursing note and vitals reviewed.  Constitutional: He appears well-developed and well-nourished.   HENT:   Head: Normocephalic and atraumatic.   Mouth/Throat: Mucous membranes are normal.   Eyes: EOM are normal. Pupils are equal, round, and reactive to light.   Neck: Neck supple.   Normal range of motion.  Cardiovascular:  Normal rate, regular rhythm, normal heart sounds and intact distal pulses.           Pulmonary/Chest: Breath sounds normal.   Abdominal: Abdomen is soft. Bowel sounds are normal.   Musculoskeletal:         General: Normal range of motion.      Cervical back: Normal range of motion and neck supple.     Neurological: He is alert and oriented to person, place, and time. He has normal strength.   Spastic left hemiplegia of the upper extremity (14 years ago). No numbness in the foot.    Skin: Skin is warm and dry. Capillary refill takes less than 2 seconds.   Psychiatric: He has a normal mood and affect. His behavior is normal. Judgment and thought content normal.       ED Course   Procedures  Labs Reviewed   COMPREHENSIVE METABOLIC PANEL - Abnormal; Notable for the following components:       Result Value    Creatinine 1.57 (*)     Protein Total 8.3 (*)     Globulin 4.7 (*)     Albumin/Globulin Ratio 0.8 (*)     All other components within normal limits   CBC WITH DIFFERENTIAL - Abnormal; Notable for the following components:    RBC 4.64 (*)     Hgb 13.6 (*)     Hct 41.6 (*)     MCHC 32.7 (*)     MPV 11.0 (*)     All other components within normal limits   B-TYPE NATRIURETIC PEPTIDE - Normal   COVID/FLU A&B PCR - Normal   CBC W/ AUTO DIFFERENTIAL    Narrative:      The following orders were created for panel order CBC auto differential.  Procedure                               Abnormality         Status                     ---------                               -----------         ------                     CBC with Differential[[id number]]        Abnormal            Final result                 Please view results for these tests on the individual orders.   TROPONIN ISTAT     EKG Readings: (Independently Interpreted)   Initial Reading: No STEMI. Rhythm: Sinus Bradycardia. Heart Rate: 59. Ectopy: No Ectopy. Conduction: Normal. ST Segments: Normal ST Segments. T Waves: Normal. Axis: Normal.     Imaging Results              X-Ray Foot Complete Left (Final result)  Result time 09/04/22 15:41:01      Final result by Christoph Grey MD (09/04/22 15:41:01)                   Impression:      Diffuse soft tissue swelling without underlying fracture.      Electronically signed by: Christoph Grey MD  Date:    09/04/2022  Time:    15:41               Narrative:    EXAMINATION:  Three radiographic views of the LEFT FOOT.    CLINICAL HISTORY:  Pain in left foot    TECHNIQUE:  Three radiographic views of the LEFT FOOT.    COMPARISON:  None.    FINDINGS:  Three views of the left foot demonstrate normal alignment.  There is no fracture.  There is no bony mass lesion.  There is diffuse soft tissue swelling.                                       X-Ray Ankle Complete Left (Final result)  Result time 09/04/22 15:41:26      Final result by Christoph Grey MD (09/04/22 15:41:26)                   Impression:      Diffuse soft tissue swelling without underlying fracture.      Electronically signed by: Christoph rGey MD  Date:    09/04/2022  Time:    15:41               Narrative:    EXAMINATION:  Three radiographic views of the LEFT ANKLE.    CLINICAL HISTORY:  Pain in unspecified foot    TECHNIQUE:  Three radiographic views of the LEFT ANKLE.    COMPARISON:  None.    FINDINGS:  Three views of the left ankle  demonstrate normal alignment.  There is no fracture.  There is no bony mass lesion.  There is diffuse soft tissue swelling.                                       Medications - No data to display  Medical Decision Making:   Clinical Tests:   Lab Tests: Ordered and Reviewed  Radiological Study: Ordered and Reviewed  ED Management:  Patient was handed off to me by Dr. Martínez at 1800  Patient was examined   DP pulses appreciated  There is some trace edema to the feet but nothing past ankles or to the knee  Lung sounds are clear  BNP was negative   Labs largely unremarkable other than a slight bump in his creatinine   Offered patient IV fluids which he states he can drink water himself   I do strongly feel that the patient's symptoms are due to dependent edema   Compression stockings advised and elevation of the feet at any chance he has   Strong ER return precautions were discussed and follow-up PCP is recommended                        Clinical Impression:   Final diagnoses:  [M79.673] Foot pain  [M79.672] Left foot pain  [R60.0] Bilateral lower extremity edema  [R60.9] Dependent edema (Primary)        ED Disposition Condition    Discharge Stable          ED Prescriptions    None       Follow-up Information       Follow up With Specialties Details Why Contact Info    [facility] - Emergency Dept Emergency Medicine  If symptoms worsen [address] [facility] [id number] [phone]    Rosemary Arnold MD Internal Medicine Schedule an appointment as soon as possible for a visit   [address] [place] [id number] [phone]               Todd Castaneda MD  09/04/22 1913

## 2022-09-05 NOTE — ED NOTES
Patient brought in wiuth L foot pain after rolling ankle. Hx of stroke with paralysis to L arm and limited mobility with L leg. Patient in no acute distress at this time. Call and family at bedside. No further questions or complaints at this time.

## 2022-11-16 DIAGNOSIS — R60.0 PEDAL EDEMA: ICD-10-CM

## 2022-11-16 DIAGNOSIS — I10 ESSENTIAL HYPERTENSION: ICD-10-CM

## 2022-11-16 RX ORDER — HYDROCHLOROTHIAZIDE 25 MG/1
25 TABLET ORAL DAILY
Qty: 30 TABLET | Refills: 2 | Status: SHIPPED | OUTPATIENT
Start: 2022-11-16 | End: 2023-04-13 | Stop reason: SDUPTHER

## 2022-11-16 RX ORDER — CARVEDILOL 12.5 MG/1
12.5 TABLET ORAL 2 TIMES DAILY WITH MEALS
Qty: 60 TABLET | Refills: 5 | Status: SHIPPED | OUTPATIENT
Start: 2022-11-16 | End: 2023-04-13 | Stop reason: SDUPTHER

## 2022-11-17 RX ORDER — HYDROCHLOROTHIAZIDE 25 MG/1
25 TABLET ORAL DAILY
Qty: 30 TABLET | Refills: 2 | OUTPATIENT
Start: 2022-11-17 | End: 2023-02-15

## 2022-11-17 RX ORDER — CARVEDILOL 12.5 MG/1
12.5 TABLET ORAL 2 TIMES DAILY WITH MEALS
Qty: 60 TABLET | Refills: 5 | OUTPATIENT
Start: 2022-11-17 | End: 2023-05-16

## 2022-11-28 PROBLEM — N18.31 CHRONIC RENAL IMPAIRMENT, STAGE 3A: Status: ACTIVE | Noted: 2022-05-23

## 2023-04-03 RX ORDER — POTASSIUM CHLORIDE 20 MEQ/1
TABLET, EXTENDED RELEASE ORAL
Qty: 68 TABLET | Refills: 0 | OUTPATIENT
Start: 2023-04-03

## 2023-04-03 RX ORDER — MONTELUKAST SODIUM 10 MG/1
TABLET ORAL
Qty: 30 TABLET | Refills: 0 | OUTPATIENT
Start: 2023-04-03

## 2023-04-04 ENCOUNTER — TELEPHONE (OUTPATIENT)
Dept: PRIMARY CARE CLINIC | Facility: CLINIC | Age: 73
End: 2023-04-04
Payer: MEDICARE

## 2023-04-04 DIAGNOSIS — J30.89 ENVIRONMENTAL AND SEASONAL ALLERGIES: ICD-10-CM

## 2023-04-04 RX ORDER — MONTELUKAST SODIUM 10 MG/1
10 TABLET ORAL NIGHTLY
Qty: 30 TABLET | Refills: 0 | Status: SHIPPED | OUTPATIENT
Start: 2023-04-04 | End: 2023-04-13 | Stop reason: SDUPTHER

## 2023-04-04 NOTE — TELEPHONE ENCOUNTER
----- Message from Zaira Cox sent at 4/4/2023  3:58 PM CDT -----  .Type:  RX Refill Request    Who Called: pt daughter  Refill or New Rx:refill  RX Name and Strength:montelukast (SINGULAIR) 10 mg tablet  How is the patient currently taking it? Once a day  Is this a 30 day or 90 day RX:30  Preferred Pharmacy with phone number:[facility] - [address]  Local or Mail Order:local   Ordering Provider:Clayton  Would the patient rather a call back or a response via [facility]? Call back   Best Call Back Number:[phone]  Additional Information: pt daughter is requesting a refill pt is out of his meds

## 2023-04-05 NOTE — TELEPHONE ENCOUNTER
Spoke with the patient's daughter and let her know her father's prescription was sent to the pharmacy and the patient's daughter verbalized understanding.

## 2023-04-12 NOTE — PROGRESS NOTES
Rosemary Arnold MD   [address] [address]     Patient ID: [id number]     Chief Complaint: Hypertension        HPI:     Cody Agustin is a 72 y.o. male here today for a follow up of hypertension with CKD and prior stroke. He is out of all but one of his medications. I had approved two but only see one recent on this list, he has had multiple charts as well as multiple  issues. No other complaints today.       Subjective:     Review of Systems   Constitutional: Negative.         He is only driving to store, his daughter does most of the driving.    Respiratory:  Negative for cough and shortness of breath.    Cardiovascular:  Positive for leg swelling. Negative for chest pain and palpitations.   Gastrointestinal:         Potassium tastes bad, he mixes with OJ but does prefer the liquid   Genitourinary:         Nocturia   Musculoskeletal:  Positive for joint pain and myalgias.        Legs still, he says he is still taking gabapentin when he has it.    Neurological:  Positive for focal weakness.   Psychiatric/Behavioral: Negative.       Past Medical History:   Diagnosis Date    Chronic renal failure     CVA (cerebral vascular accident)     HTN (hypertension)     Illiterate     Left spastic hemiplegia         Past Surgical History:   Procedure Laterality Date    FOOT ARTHRODESIS W/ [place] ARTHROEREISIS IMPLANT Right 2015       Family History   Problem Relation Age of Onset    Stroke Mother     Heart attack Father     Diabetes Sister     Kidney failure Sister     Heart failure Brother     Hypertension Brother     Stroke Brother     Heart failure Brother     Heart failure Brother         Social History     Socioeconomic History    Marital status: Single   Tobacco Use    Smoking status: Never    Smokeless tobacco: Current     Types: Snuff       Review of patient's allergies indicates:  No Known Allergies    Outpatient Medications Marked as Taking for the 23 encounter (Office Visit) with Rosemary  ABRIL Arnold MD   Medication Sig Dispense Refill    [DISCONTINUED] albuterol (PROVENTIL/VENTOLIN HFA) 90 mcg/actuation inhaler Inhale into the lungs.      [DISCONTINUED] amLODIPine (NORVASC) 10 MG tablet TAKE ONE TABLET BY MOUTH DAILY 30 tablet 0    [DISCONTINUED] carvediloL (COREG) 12.5 MG tablet Take 1 tablet (12.5 mg total) by mouth 2 (two) times daily with meals. 60 tablet 5    [DISCONTINUED] gabapentin (NEURONTIN) 600 MG tablet Take 1 tablet (600 mg total) by mouth 3 (three) times daily. 90 tablet 5    [DISCONTINUED] hydroCHLOROthiazide (HYDRODIURIL) 25 MG tablet Take 1 tablet (25 mg total) by mouth once daily. 30 tablet 2    [DISCONTINUED] montelukast (SINGULAIR) 10 mg tablet Take 1 tablet (10 mg total) by mouth every evening. 30 tablet 0    [DISCONTINUED] potassium chloride (KLOR-CON M20 ORAL) Take 20 mEq by mouth. 1 BID DAILY  WITH EXTRA DOSE 3 TIMES ADAY ON Wednesday AND SATURDAY      [DISCONTINUED] pravastatin (PRAVACHOL) 20 MG tablet Take 20 mg by mouth once daily at 6am.         Patient Care Team:  Rosemary Arnold MD as PCP - General (Internal Medicine)       Objective:     BP (!) 161/88 (BP Location: Right arm, Patient Position: Sitting, BP Method: Medium (Automatic))   Pulse (!) 50   Temp 98.2 °F (36.8 °C) (Oral)   Resp 16   Ht 5' (1.524 m)   Wt 86.2 kg (190 lb)   SpO2 97%   BMI 37.11 kg/m²     Physical Exam  Vitals reviewed.   Constitutional:       General: He is not in acute distress.     Appearance: He is ill-appearing.   HENT:      Head: Normocephalic and atraumatic.   Cardiovascular:      Rate and Rhythm: Regular rhythm. Bradycardia present.      Heart sounds:     No gallop.   Pulmonary:      Breath sounds: Normal breath sounds. No wheezing or rhonchi.   Abdominal:      Palpations: Abdomen is soft.   Neurological:      Mental Status: He is alert. Mental status is at baseline.      Comments: Walking with 4 prong cane, hemiparesis with weakness in arm >leg on left       No visits with results  within 6 Month(s) from this visit.   Latest known visit with results is:   Admission on 09/04/2022, Discharged on 09/04/2022   Component Date Value    Natriuretic Peptide 09/04/2022 36.8     Sodium Level 09/04/2022 139     Potassium Level 09/04/2022 3.6     Chloride 09/04/2022 103     Carbon Dioxide 09/04/2022 23     Glucose Level 09/04/2022 88     Blood Urea Nitrogen 09/04/2022 21.7     Creatinine 09/04/2022 1.57 (H)     Calcium Level Total 09/04/2022 9.3     Protein Total 09/04/2022 8.3 (H)     Albumin Level 09/04/2022 3.6     Globulin 09/04/2022 4.7 (H)     Albumin/Globulin Ratio 09/04/2022 0.8 (L)     Bilirubin Total 09/04/2022 0.5     Alkaline Phosphatase 09/04/2022 107     Alanine Aminotransferase 09/04/2022 18     Aspartate Aminotransfera* 09/04/2022 23     eGFR 09/04/2022 46     WBC 09/04/2022 7.3     RBC 09/04/2022 4.64 (L)     Hgb 09/04/2022 13.6 (L)     Hct 09/04/2022 41.6 (L)     MCV 09/04/2022 89.7     MCH 09/04/2022 29.3     MCHC 09/04/2022 32.7 (L)     RDW 09/04/2022 14.2     Platelet 09/04/2022 225     MPV 09/04/2022 11.0 (H)     Neut % 09/04/2022 56.4     Lymph % 09/04/2022 27.3     Mono % 09/04/2022 8.9     Eos % 09/04/2022 6.8     Basophil % 09/04/2022 0.5     Lymph # 09/04/2022 1.99     Neut # 09/04/2022 4.1     Mono # 09/04/2022 0.65     Eos # 09/04/2022 0.50     Baso # 09/04/2022 0.04     POC Cardiac Troponin I 09/04/2022 0.06     Sample 09/04/2022 unknown     Influenza A PCR 09/04/2022 Not Detected     Influenza B PCR 09/04/2022 Not Detected     SARS-CoV-2 PCR 09/04/2022 Not Detected        Assessment/Problems:       ICD-10-CM ICD-9-CM   1. Uncontrolled hypertension  I10 401.9   2. Chronic renal impairment, stage 3a  N18.31 585.3   3. Hypokalemia  E87.6 276.8   4. Spastic hemiparesis of left nondominant side as late effect of cerebral infarction  I69.354 438.22   5. Illiterate  Z55.0 V62.3   6. Environmental and seasonal allergies  J30.89 477.8   7. Microcytic anemia  D50.9 280.9   8.  Screening for colon cancer  Z12.11 V76.51   9. Encounter for hepatitis C screening test for low risk patient  Z11.59 V73.89        Plan:     1. Uncontrolled hypertension  Comments:  Out of multiple medications, discussed again he can't let them run out, he's going to get another stroke.   Orders:  -     Comprehensive Metabolic Panel    2. Chronic renal impairment, stage 3a  Comments:  Need to recheck, with BP he's at high risk for failure but he can't get to other doctors easily family works. Transportation is an issue  Orders:  -     Comprehensive Metabolic Panel  -     CBC Auto Differential    3. Hypokalemia    4. Spastic hemiparesis of left nondominant side as late effect of cerebral infarction  Comments:  Has pain in the leg, told neuropathy but more likely from abnormal gait, has done PT in past    5. Illiterate  Comments:  Does well, worked even after stroke for years with [employer]    6. Environmental and seasonal allergies  Comments:  Says he still does the SIngulair will refill.   Orders:  -     montelukast (SINGULAIR) 10 mg tablet; Take 1 tablet (10 mg total) by mouth every evening.  Dispense: 30 tablet; Refill: 2    7. Microcytic anemia  Comments:  Recheck with iron  Orders:  -     CBC Auto Differential  -     Iron and TIBC    8. Screening for colon cancer  -     Cologuard Screening (Multitarget Stool DNA); Future; Expected date: 04/14/2023    9. Encounter for hepatitis C screening test for low risk patient  -     Hepatitis C Antibody    Other orders  -     amLODIPine (NORVASC) 10 MG tablet; Take 1 tablet (10 mg total) by mouth once daily.  Dispense: 30 tablet; Refill: 5  -     carvediloL (COREG) 12.5 MG tablet; Take 1 tablet (12.5 mg total) by mouth 2 (two) times daily with meals.  Dispense: 60 tablet; Refill: 5  -     hydroCHLOROthiazide (HYDRODIURIL) 25 MG tablet; Take 1 tablet (25 mg total) by mouth once daily.  Dispense: 30 tablet; Refill: 5  -     pravastatin (PRAVACHOL) 20 MG tablet; Take 1  tablet (20 mg total) by mouth every evening.  Dispense: 30 tablet; Refill: 5  -     gabapentin (NEURONTIN) 600 MG tablet; Take 1 tablet (600 mg total) by mouth 3 (three) times daily.  Dispense: 90 tablet; Refill: 5  -     potassium chloride 10% (KAYCIEL) 20 mEq/15 mL oral solution; Take 15 mLs (20 mEq total) by mouth once daily.  Dispense: 473 mL; Refill: 2       Questions in Epic about obesity, his weight is not accurate, he is unable to stand safely and has to keep his cane as well as wearing boots and heavy clothes today.  Height also looks to be in error, old chart put him at 5ft 8in which seems higher than accurate.       Follow up in about 3 months (around 7/13/2023) for uncontrolled HTN. In addition to their scheduled follow up, the patient has also been instructed to follow up on as needed basis.     Signature:  Rosemary Arnold MD  [facility]  [id number] [address]

## 2023-04-13 ENCOUNTER — OFFICE VISIT (OUTPATIENT)
Dept: PRIMARY CARE CLINIC | Facility: CLINIC | Age: 73
End: 2023-04-13
Payer: MEDICARE

## 2023-04-13 VITALS
HEART RATE: 50 BPM | BODY MASS INDEX: 35.34 KG/M2 | WEIGHT: 180 LBS | OXYGEN SATURATION: 97 % | RESPIRATION RATE: 16 BRPM | TEMPERATURE: 98 F | HEIGHT: 60 IN | SYSTOLIC BLOOD PRESSURE: 161 MMHG | DIASTOLIC BLOOD PRESSURE: 88 MMHG

## 2023-04-13 DIAGNOSIS — I10 UNCONTROLLED HYPERTENSION: Primary | ICD-10-CM

## 2023-04-13 DIAGNOSIS — D50.9 MICROCYTIC ANEMIA: ICD-10-CM

## 2023-04-13 DIAGNOSIS — J30.89 ENVIRONMENTAL AND SEASONAL ALLERGIES: ICD-10-CM

## 2023-04-13 DIAGNOSIS — Z55.0 ILLITERATE: ICD-10-CM

## 2023-04-13 DIAGNOSIS — N18.31 CHRONIC RENAL IMPAIRMENT, STAGE 3A: ICD-10-CM

## 2023-04-13 DIAGNOSIS — Z12.11 SCREENING FOR COLON CANCER: ICD-10-CM

## 2023-04-13 DIAGNOSIS — I69.354 SPASTIC HEMIPARESIS OF LEFT NONDOMINANT SIDE AS LATE EFFECT OF CEREBRAL INFARCTION: ICD-10-CM

## 2023-04-13 DIAGNOSIS — E87.6 HYPOKALEMIA: ICD-10-CM

## 2023-04-13 DIAGNOSIS — Z11.59 ENCOUNTER FOR HEPATITIS C SCREENING TEST FOR LOW RISK PATIENT: ICD-10-CM

## 2023-04-13 LAB
ALBUMIN SERPL-MCNC: 3.6 G/DL (ref 3.4–4.8)
ALBUMIN/GLOB SERPL: 1 RATIO (ref 1.1–2)
ALP SERPL-CCNC: 74 UNIT/L (ref 40–150)
ALT SERPL-CCNC: 12 UNIT/L (ref 0–55)
AST SERPL-CCNC: 15 UNIT/L (ref 5–34)
BASOPHILS # BLD AUTO: 0.03 X10(3)/MCL (ref 0–0.2)
BASOPHILS NFR BLD AUTO: 0.4 %
BILIRUBIN DIRECT+TOT PNL SERPL-MCNC: 0.4 MG/DL
BUN SERPL-MCNC: 18.6 MG/DL (ref 8.4–25.7)
CALCIUM SERPL-MCNC: 8.9 MG/DL (ref 8.8–10)
CHLORIDE SERPL-SCNC: 102 MMOL/L (ref 98–107)
CO2 SERPL-SCNC: 27 MMOL/L (ref 23–31)
CREAT SERPL-MCNC: 1.44 MG/DL (ref 0.73–1.18)
EOSINOPHIL # BLD AUTO: 0.18 X10(3)/MCL (ref 0–0.9)
EOSINOPHIL NFR BLD AUTO: 2.5 %
ERYTHROCYTE [DISTWIDTH] IN BLOOD BY AUTOMATED COUNT: 14.1 % (ref 11.5–17)
GFR SERPLBLD CREATININE-BSD FMLA CKD-EPI: 51 MLS/MIN/1.73/M2
GLOBULIN SER-MCNC: 3.7 GM/DL (ref 2.4–3.5)
GLUCOSE SERPL-MCNC: 88 MG/DL (ref 82–115)
HCT VFR BLD AUTO: 40.1 % (ref 42–52)
HGB BLD-MCNC: 13.1 G/DL (ref 14–18)
IMM GRANULOCYTES # BLD AUTO: 0.01 X10(3)/MCL (ref 0–0.04)
IMM GRANULOCYTES NFR BLD AUTO: 0.1 %
IRON SATN MFR SERPL: 29 % (ref 20–50)
IRON SERPL-MCNC: 75 UG/DL (ref 65–175)
LYMPHOCYTES # BLD AUTO: 1.83 X10(3)/MCL (ref 0.6–4.6)
LYMPHOCYTES NFR BLD AUTO: 25.6 %
MCH RBC QN AUTO: 29.2 PG (ref 27–31)
MCHC RBC AUTO-ENTMCNC: 32.7 G/DL (ref 33–36)
MCV RBC AUTO: 89.5 FL (ref 80–94)
MONOCYTES # BLD AUTO: 0.72 X10(3)/MCL (ref 0.1–1.3)
MONOCYTES NFR BLD AUTO: 10.1 %
NEUTROPHILS # BLD AUTO: 4.39 X10(3)/MCL (ref 2.1–9.2)
NEUTROPHILS NFR BLD AUTO: 61.3 %
PLATELET # BLD AUTO: 215 X10(3)/MCL (ref 130–400)
PMV BLD AUTO: 12.9 FL (ref 7.4–10.4)
POTASSIUM SERPL-SCNC: 2.9 MMOL/L (ref 3.5–5.1)
PROT SERPL-MCNC: 7.3 GM/DL (ref 5.8–7.6)
RBC # BLD AUTO: 4.48 X10(6)/MCL (ref 4.7–6.1)
SODIUM SERPL-SCNC: 138 MMOL/L (ref 136–145)
TIBC SERPL-MCNC: 187 UG/DL (ref 69–240)
TIBC SERPL-MCNC: 262 UG/DL (ref 250–450)
TRANSFERRIN SERPL-MCNC: 244 MG/DL (ref 163–344)
WBC # SPEC AUTO: 7.2 X10(3)/MCL (ref 4.5–11.5)

## 2023-04-13 PROCEDURE — 3077F SYST BP >= 140 MM HG: CPT | Mod: CPTII,,, | Performed by: INTERNAL MEDICINE

## 2023-04-13 PROCEDURE — 3079F DIAST BP 80-89 MM HG: CPT | Mod: CPTII,,, | Performed by: INTERNAL MEDICINE

## 2023-04-13 PROCEDURE — 99214 OFFICE O/P EST MOD 30 MIN: CPT | Mod: ,,, | Performed by: INTERNAL MEDICINE

## 2023-04-13 PROCEDURE — 36415 COLL VENOUS BLD VENIPUNCTURE: CPT | Performed by: INTERNAL MEDICINE

## 2023-04-13 PROCEDURE — 3288F PR FALLS RISK ASSESSMENT DOCUMENTED: ICD-10-PCS | Mod: CPTII,,, | Performed by: INTERNAL MEDICINE

## 2023-04-13 PROCEDURE — 3008F BODY MASS INDEX DOCD: CPT | Mod: CPTII,,, | Performed by: INTERNAL MEDICINE

## 2023-04-13 PROCEDURE — 85025 COMPLETE CBC W/AUTO DIFF WBC: CPT | Performed by: INTERNAL MEDICINE

## 2023-04-13 PROCEDURE — 1159F PR MEDICATION LIST DOCUMENTED IN MEDICAL RECORD: ICD-10-PCS | Mod: CPTII,,, | Performed by: INTERNAL MEDICINE

## 2023-04-13 PROCEDURE — 80053 COMPREHEN METABOLIC PANEL: CPT | Performed by: INTERNAL MEDICINE

## 2023-04-13 PROCEDURE — 3079F PR MOST RECENT DIASTOLIC BLOOD PRESSURE 80-89 MM HG: ICD-10-PCS | Mod: CPTII,,, | Performed by: INTERNAL MEDICINE

## 2023-04-13 PROCEDURE — 36415 PR COLLECTION VENOUS BLOOD,VENIPUNCTURE: ICD-10-PCS | Mod: ,,, | Performed by: INTERNAL MEDICINE

## 2023-04-13 PROCEDURE — 83540 ASSAY OF IRON: CPT | Performed by: INTERNAL MEDICINE

## 2023-04-13 PROCEDURE — 36415 COLL VENOUS BLD VENIPUNCTURE: CPT | Mod: ,,, | Performed by: INTERNAL MEDICINE

## 2023-04-13 PROCEDURE — 1101F PR PT FALLS ASSESS DOC 0-1 FALLS W/OUT INJ PAST YR: ICD-10-PCS | Mod: CPTII,,, | Performed by: INTERNAL MEDICINE

## 2023-04-13 PROCEDURE — 1159F MED LIST DOCD IN RCRD: CPT | Mod: CPTII,,, | Performed by: INTERNAL MEDICINE

## 2023-04-13 PROCEDURE — 1126F AMNT PAIN NOTED NONE PRSNT: CPT | Mod: CPTII,,, | Performed by: INTERNAL MEDICINE

## 2023-04-13 PROCEDURE — 3077F PR MOST RECENT SYSTOLIC BLOOD PRESSURE >= 140 MM HG: ICD-10-PCS | Mod: CPTII,,, | Performed by: INTERNAL MEDICINE

## 2023-04-13 PROCEDURE — 99214 PR OFFICE/OUTPT VISIT, EST, LEVL IV, 30-39 MIN: ICD-10-PCS | Mod: ,,, | Performed by: INTERNAL MEDICINE

## 2023-04-13 PROCEDURE — 1101F PT FALLS ASSESS-DOCD LE1/YR: CPT | Mod: CPTII,,, | Performed by: INTERNAL MEDICINE

## 2023-04-13 PROCEDURE — 1126F PR PAIN SEVERITY QUANTIFIED, NO PAIN PRESENT: ICD-10-PCS | Mod: CPTII,,, | Performed by: INTERNAL MEDICINE

## 2023-04-13 PROCEDURE — 3288F FALL RISK ASSESSMENT DOCD: CPT | Mod: CPTII,,, | Performed by: INTERNAL MEDICINE

## 2023-04-13 PROCEDURE — 86803 HEPATITIS C AB TEST: CPT | Performed by: INTERNAL MEDICINE

## 2023-04-13 PROCEDURE — 1160F PR REVIEW ALL MEDS BY PRESCRIBER/CLIN PHARMACIST DOCUMENTED: ICD-10-PCS | Mod: CPTII,,, | Performed by: INTERNAL MEDICINE

## 2023-04-13 PROCEDURE — 3008F PR BODY MASS INDEX (BMI) DOCUMENTED: ICD-10-PCS | Mod: CPTII,,, | Performed by: INTERNAL MEDICINE

## 2023-04-13 PROCEDURE — 1160F RVW MEDS BY RX/DR IN RCRD: CPT | Mod: CPTII,,, | Performed by: INTERNAL MEDICINE

## 2023-04-13 RX ORDER — MONTELUKAST SODIUM 10 MG/1
10 TABLET ORAL NIGHTLY
Qty: 30 TABLET | Refills: 2 | Status: SHIPPED | OUTPATIENT
Start: 2023-04-13 | End: 2023-07-12

## 2023-04-13 RX ORDER — POTASSIUM CHLORIDE 20 MEQ/15ML
20 SOLUTION ORAL DAILY
Qty: 473 ML | Refills: 2 | Status: SHIPPED | OUTPATIENT
Start: 2023-04-13 | End: 2023-09-10 | Stop reason: SDUPTHER

## 2023-04-13 RX ORDER — AMLODIPINE BESYLATE 10 MG/1
10 TABLET ORAL DAILY
Qty: 30 TABLET | Refills: 5 | Status: SHIPPED | OUTPATIENT
Start: 2023-04-13 | End: 2023-11-15

## 2023-04-13 RX ORDER — CARVEDILOL 12.5 MG/1
12.5 TABLET ORAL 2 TIMES DAILY WITH MEALS
Qty: 60 TABLET | Refills: 5 | Status: SHIPPED | OUTPATIENT
Start: 2023-04-13 | End: 2023-11-15

## 2023-04-13 RX ORDER — HYDROCHLOROTHIAZIDE 25 MG/1
25 TABLET ORAL DAILY
Qty: 30 TABLET | Refills: 5 | Status: SHIPPED | OUTPATIENT
Start: 2023-04-13 | End: 2023-11-15

## 2023-04-13 RX ORDER — PRAVASTATIN SODIUM 20 MG/1
20 TABLET ORAL NIGHTLY
Qty: 30 TABLET | Refills: 5 | Status: SHIPPED | OUTPATIENT
Start: 2023-04-13 | End: 2023-11-15

## 2023-04-13 RX ORDER — GABAPENTIN 600 MG/1
600 TABLET ORAL 3 TIMES DAILY
Qty: 90 TABLET | Refills: 5 | Status: SHIPPED | OUTPATIENT
Start: 2023-04-13 | End: 2023-12-19

## 2023-04-13 SDOH — SOCIAL DETERMINANTS OF HEALTH (SDOH): ILITERACY AND LOW LEVEL LITERACY: Z55.0

## 2023-04-14 LAB — HCV AB SERPL QL IA: NONREACTIVE

## 2023-07-12 ENCOUNTER — TELEPHONE (OUTPATIENT)
Dept: PRIMARY CARE CLINIC | Facility: CLINIC | Age: 73
End: 2023-07-12
Payer: MEDICARE

## 2023-07-19 ENCOUNTER — OFFICE VISIT (OUTPATIENT)
Dept: PRIMARY CARE CLINIC | Facility: CLINIC | Age: 73
End: 2023-07-19
Payer: MEDICARE

## 2023-07-19 VITALS
HEART RATE: 59 BPM | RESPIRATION RATE: 18 BRPM | BODY MASS INDEX: 39.07 KG/M2 | DIASTOLIC BLOOD PRESSURE: 91 MMHG | HEIGHT: 60 IN | TEMPERATURE: 98 F | WEIGHT: 199 LBS | SYSTOLIC BLOOD PRESSURE: 150 MMHG

## 2023-07-19 DIAGNOSIS — N39.490 OVERFLOW INCONTINENCE OF URINE: ICD-10-CM

## 2023-07-19 DIAGNOSIS — R06.09 DYSPNEA ON EXERTION: ICD-10-CM

## 2023-07-19 DIAGNOSIS — N18.31 CHRONIC KIDNEY DISEASE, STAGE 3A: ICD-10-CM

## 2023-07-19 DIAGNOSIS — R00.1 BRADYCARDIA, DRUG INDUCED: ICD-10-CM

## 2023-07-19 DIAGNOSIS — M25.512 CHRONIC LEFT SHOULDER PAIN: ICD-10-CM

## 2023-07-19 DIAGNOSIS — R35.1 BENIGN PROSTATIC HYPERPLASIA WITH NOCTURIA: ICD-10-CM

## 2023-07-19 DIAGNOSIS — G89.29 CHRONIC LEFT SHOULDER PAIN: ICD-10-CM

## 2023-07-19 DIAGNOSIS — I69.354 SPASTIC HEMIPARESIS OF LEFT NONDOMINANT SIDE AS LATE EFFECT OF CEREBRAL INFARCTION: ICD-10-CM

## 2023-07-19 DIAGNOSIS — N40.1 BENIGN PROSTATIC HYPERPLASIA WITH NOCTURIA: ICD-10-CM

## 2023-07-19 DIAGNOSIS — E66.01 SEVERE OBESITY (BMI 35.0-39.9) WITH COMORBIDITY: ICD-10-CM

## 2023-07-19 DIAGNOSIS — R77.1 ELEVATED SERUM GLOBULIN LEVEL: ICD-10-CM

## 2023-07-19 DIAGNOSIS — I10 UNCONTROLLED HYPERTENSION: ICD-10-CM

## 2023-07-19 DIAGNOSIS — Z55.0 ILLITERATE: ICD-10-CM

## 2023-07-19 DIAGNOSIS — Z00.00 MEDICARE ANNUAL WELLNESS VISIT, SUBSEQUENT: Primary | ICD-10-CM

## 2023-07-19 DIAGNOSIS — Z12.5 PROSTATE CANCER SCREENING: ICD-10-CM

## 2023-07-19 DIAGNOSIS — N18.31 CHRONIC RENAL IMPAIRMENT, STAGE 3A: ICD-10-CM

## 2023-07-19 DIAGNOSIS — Z72.0 CHEWS TOBACCO: ICD-10-CM

## 2023-07-19 DIAGNOSIS — T50.905A BRADYCARDIA, DRUG INDUCED: ICD-10-CM

## 2023-07-19 DIAGNOSIS — Z91.199 NONCOMPLIANCE WITH TREATMENT REGIMEN: ICD-10-CM

## 2023-07-19 LAB
ALBUMIN SERPL-MCNC: 3.7 G/DL (ref 3.4–4.8)
ALBUMIN/GLOB SERPL: 0.9 RATIO (ref 1.1–2)
ALP SERPL-CCNC: 64 UNIT/L (ref 40–150)
ALT SERPL-CCNC: 15 UNIT/L (ref 0–55)
APPEARANCE UR: CLEAR
AST SERPL-CCNC: 19 UNIT/L (ref 5–34)
BACTERIA #/AREA URNS AUTO: NORMAL /HPF
BASOPHILS # BLD AUTO: 0.04 X10(3)/MCL
BASOPHILS NFR BLD AUTO: 0.6 %
BILIRUB UR QL STRIP.AUTO: NEGATIVE
BILIRUBIN DIRECT+TOT PNL SERPL-MCNC: 0.3 MG/DL
BUN SERPL-MCNC: 22 MG/DL (ref 8.4–25.7)
CALCIUM SERPL-MCNC: 9 MG/DL (ref 8.8–10)
CHLORIDE SERPL-SCNC: 105 MMOL/L (ref 98–107)
CHOLEST SERPL-MCNC: 184 MG/DL
CHOLEST/HDLC SERPL: 5 {RATIO} (ref 0–5)
CO2 SERPL-SCNC: 26 MMOL/L (ref 23–31)
COLOR UR: YELLOW
CREAT SERPL-MCNC: 1.47 MG/DL (ref 0.73–1.18)
EOSINOPHIL # BLD AUTO: 0.22 X10(3)/MCL (ref 0–0.9)
EOSINOPHIL NFR BLD AUTO: 3.5 %
ERYTHROCYTE [DISTWIDTH] IN BLOOD BY AUTOMATED COUNT: 14.5 % (ref 11.5–17)
GFR SERPLBLD CREATININE-BSD FMLA CKD-EPI: 50 MLS/MIN/1.73/M2
GLOBULIN SER-MCNC: 4.1 GM/DL (ref 2.4–3.5)
GLUCOSE SERPL-MCNC: 96 MG/DL (ref 82–115)
GLUCOSE UR QL STRIP.AUTO: NEGATIVE
HCT VFR BLD AUTO: 39.6 % (ref 42–52)
HDLC SERPL-MCNC: 35 MG/DL (ref 35–60)
HGB BLD-MCNC: 12.9 G/DL (ref 14–18)
IMM GRANULOCYTES # BLD AUTO: 0 X10(3)/MCL (ref 0–0.04)
IMM GRANULOCYTES NFR BLD AUTO: 0 %
KETONES UR QL STRIP.AUTO: NEGATIVE
LDLC SERPL CALC-MCNC: 134 MG/DL (ref 50–140)
LEUKOCYTE ESTERASE UR QL STRIP.AUTO: ABNORMAL
LYMPHOCYTES # BLD AUTO: 2.02 X10(3)/MCL (ref 0.6–4.6)
LYMPHOCYTES NFR BLD AUTO: 32.6 %
MCH RBC QN AUTO: 29.2 PG (ref 27–31)
MCHC RBC AUTO-ENTMCNC: 32.6 G/DL (ref 33–36)
MCV RBC AUTO: 89.6 FL (ref 80–94)
MONOCYTES # BLD AUTO: 0.45 X10(3)/MCL (ref 0.1–1.3)
MONOCYTES NFR BLD AUTO: 7.3 %
NEUTROPHILS # BLD AUTO: 3.47 X10(3)/MCL (ref 2.1–9.2)
NEUTROPHILS NFR BLD AUTO: 56 %
NITRITE UR QL STRIP.AUTO: NEGATIVE
PH UR STRIP.AUTO: 5 [PH]
PLATELET # BLD AUTO: 165 X10(3)/MCL (ref 130–400)
PMV BLD AUTO: 13 FL (ref 7.4–10.4)
POTASSIUM SERPL-SCNC: 3.3 MMOL/L (ref 3.5–5.1)
PROT SERPL-MCNC: 7.8 GM/DL (ref 5.8–7.6)
PROT UR QL STRIP.AUTO: NEGATIVE
PSA SERPL-MCNC: 2.56 NG/ML
RBC # BLD AUTO: 4.42 X10(6)/MCL (ref 4.7–6.1)
RBC #/AREA URNS AUTO: NORMAL /HPF
RBC UR QL AUTO: ABNORMAL
SODIUM SERPL-SCNC: 140 MMOL/L (ref 136–145)
SP GR UR STRIP.AUTO: >=1.03
SQUAMOUS #/AREA URNS AUTO: NORMAL /HPF
TRIGL SERPL-MCNC: 73 MG/DL (ref 34–140)
URATE SERPL-MCNC: 9.6 MG/DL (ref 3.5–7.2)
UROBILINOGEN UR STRIP-ACNC: 0.2
VLDLC SERPL CALC-MCNC: 15 MG/DL
WBC # SPEC AUTO: 6.2 X10(3)/MCL (ref 4.5–11.5)
WBC #/AREA URNS AUTO: NORMAL /HPF

## 2023-07-19 PROCEDURE — 36415 PR COLLECTION VENOUS BLOOD,VENIPUNCTURE: ICD-10-PCS | Mod: ,,, | Performed by: INTERNAL MEDICINE

## 2023-07-19 PROCEDURE — 36415 COLL VENOUS BLD VENIPUNCTURE: CPT | Mod: ,,, | Performed by: INTERNAL MEDICINE

## 2023-07-19 PROCEDURE — G0439 PR MEDICARE ANNUAL WELLNESS SUBSEQUENT VISIT: ICD-10-PCS | Mod: ,,, | Performed by: INTERNAL MEDICINE

## 2023-07-19 PROCEDURE — 1125F PR PAIN SEVERITY QUANTIFIED, PAIN PRESENT: ICD-10-PCS | Mod: CPTII,,, | Performed by: INTERNAL MEDICINE

## 2023-07-19 PROCEDURE — G0439 PPPS, SUBSEQ VISIT: HCPCS | Mod: ,,, | Performed by: INTERNAL MEDICINE

## 2023-07-19 PROCEDURE — 1101F PT FALLS ASSESS-DOCD LE1/YR: CPT | Mod: CPTII,,, | Performed by: INTERNAL MEDICINE

## 2023-07-19 PROCEDURE — 81001 URINALYSIS AUTO W/SCOPE: CPT | Performed by: INTERNAL MEDICINE

## 2023-07-19 PROCEDURE — 3080F PR MOST RECENT DIASTOLIC BLOOD PRESSURE >= 90 MM HG: ICD-10-PCS | Mod: CPTII,,, | Performed by: INTERNAL MEDICINE

## 2023-07-19 PROCEDURE — 1159F MED LIST DOCD IN RCRD: CPT | Mod: CPTII,,, | Performed by: INTERNAL MEDICINE

## 2023-07-19 PROCEDURE — 1159F PR MEDICATION LIST DOCUMENTED IN MEDICAL RECORD: ICD-10-PCS | Mod: CPTII,,, | Performed by: INTERNAL MEDICINE

## 2023-07-19 PROCEDURE — 80053 COMPREHEN METABOLIC PANEL: CPT | Performed by: INTERNAL MEDICINE

## 2023-07-19 PROCEDURE — 82784 ASSAY IGA/IGD/IGG/IGM EACH: CPT | Performed by: INTERNAL MEDICINE

## 2023-07-19 PROCEDURE — 1101F PR PT FALLS ASSESS DOC 0-1 FALLS W/OUT INJ PAST YR: ICD-10-PCS | Mod: CPTII,,, | Performed by: INTERNAL MEDICINE

## 2023-07-19 PROCEDURE — 3288F FALL RISK ASSESSMENT DOCD: CPT | Mod: CPTII,,, | Performed by: INTERNAL MEDICINE

## 2023-07-19 PROCEDURE — 80061 LIPID PANEL: CPT | Performed by: INTERNAL MEDICINE

## 2023-07-19 PROCEDURE — 3077F PR MOST RECENT SYSTOLIC BLOOD PRESSURE >= 140 MM HG: ICD-10-PCS | Mod: CPTII,,, | Performed by: INTERNAL MEDICINE

## 2023-07-19 PROCEDURE — 1125F AMNT PAIN NOTED PAIN PRSNT: CPT | Mod: CPTII,,, | Performed by: INTERNAL MEDICINE

## 2023-07-19 PROCEDURE — 36415 COLL VENOUS BLD VENIPUNCTURE: CPT | Performed by: INTERNAL MEDICINE

## 2023-07-19 PROCEDURE — 84550 ASSAY OF BLOOD/URIC ACID: CPT | Performed by: INTERNAL MEDICINE

## 2023-07-19 PROCEDURE — 85025 COMPLETE CBC W/AUTO DIFF WBC: CPT | Performed by: INTERNAL MEDICINE

## 2023-07-19 PROCEDURE — 84153 ASSAY OF PSA TOTAL: CPT | Performed by: INTERNAL MEDICINE

## 2023-07-19 PROCEDURE — 3080F DIAST BP >= 90 MM HG: CPT | Mod: CPTII,,, | Performed by: INTERNAL MEDICINE

## 2023-07-19 PROCEDURE — 3008F PR BODY MASS INDEX (BMI) DOCUMENTED: ICD-10-PCS | Mod: CPTII,,, | Performed by: INTERNAL MEDICINE

## 2023-07-19 PROCEDURE — 3008F BODY MASS INDEX DOCD: CPT | Mod: CPTII,,, | Performed by: INTERNAL MEDICINE

## 2023-07-19 PROCEDURE — 3288F PR FALLS RISK ASSESSMENT DOCUMENTED: ICD-10-PCS | Mod: CPTII,,, | Performed by: INTERNAL MEDICINE

## 2023-07-19 PROCEDURE — 3077F SYST BP >= 140 MM HG: CPT | Mod: CPTII,,, | Performed by: INTERNAL MEDICINE

## 2023-07-19 PROCEDURE — 1160F PR REVIEW ALL MEDS BY PRESCRIBER/CLIN PHARMACIST DOCUMENTED: ICD-10-PCS | Mod: CPTII,,, | Performed by: INTERNAL MEDICINE

## 2023-07-19 PROCEDURE — 83521 IG LIGHT CHAINS FREE EACH: CPT | Performed by: INTERNAL MEDICINE

## 2023-07-19 PROCEDURE — 1160F RVW MEDS BY RX/DR IN RCRD: CPT | Mod: CPTII,,, | Performed by: INTERNAL MEDICINE

## 2023-07-19 RX ORDER — TAMSULOSIN HYDROCHLORIDE 0.4 MG/1
0.4 CAPSULE ORAL DAILY
Qty: 30 CAPSULE | Refills: 11 | Status: SHIPPED | OUTPATIENT
Start: 2023-07-19

## 2023-07-19 RX ORDER — BACLOFEN 10 MG/1
10 TABLET ORAL 3 TIMES DAILY
Qty: 90 TABLET | Refills: 11 | Status: SHIPPED | OUTPATIENT
Start: 2023-07-19 | End: 2024-07-18

## 2023-07-19 SDOH — SOCIAL DETERMINANTS OF HEALTH (SDOH): ILITERACY AND LOW LEVEL LITERACY: Z55.0

## 2023-07-19 NOTE — PROGRESS NOTES
Patient ID: [id number]     Chief Complaint: Medicare AWV (C/O left shoulder pain and get SOB with walking.)      HPI:     Cody Agustin is a 73 y.o. male here today for a Medicare Wellness.       Opioid Screening: Patient medication list reviewed, patient is not taking prescription opioids. Patient is not using additional opioids than prescribed. Patient is at low risk of substance abuse based on this opioid use history.       Past Medical History:   Diagnosis Date    Chronic renal failure     CVA (cerebral vascular accident)     HTN (hypertension)     Illiterate     Left spastic hemiplegia         Past Surgical History:   Procedure Laterality Date    FOOT ARTHRODESIS W/ OLGA-BRANCHEAU ARTHROEREISIS IMPLANT Right 08/13/2015       Review of patient's allergies indicates:  No Known Allergies    Outpatient Medications Marked as Taking for the 7/19/23 encounter (Office Visit) with Rosemary Arnold MD   Medication Sig Dispense Refill    amLODIPine (NORVASC) 10 MG tablet Take 1 tablet (10 mg total) by mouth once daily. 30 tablet 5    carvediloL (COREG) 12.5 MG tablet Take 1 tablet (12.5 mg total) by mouth 2 (two) times daily with meals. 60 tablet 5    gabapentin (NEURONTIN) 600 MG tablet Take 1 tablet (600 mg total) by mouth 3 (three) times daily. 90 tablet 5    hydroCHLOROthiazide (HYDRODIURIL) 25 MG tablet Take 1 tablet (25 mg total) by mouth once daily. 30 tablet 5    potassium chloride 10% (KAYCIEL) 20 mEq/15 mL oral solution Take 15 mLs (20 mEq total) by mouth once daily. 473 mL 2    pravastatin (PRAVACHOL) 20 MG tablet Take 1 tablet (20 mg total) by mouth every evening. 30 tablet 5       Social History     Socioeconomic History    Marital status: Single   Tobacco Use    Smoking status: Never    Smokeless tobacco: Current     Types: Snuff   Substance and Sexual Activity    Alcohol use: Not Currently    Drug use: Never    Sexual activity: Not Currently      He drives but less than half a mile.     Family History    Problem Relation Age of Onset    Stroke Mother     Heart attack Father     Diabetes Sister     Kidney failure Sister     Heart failure Brother     Hypertension Brother     Stroke Brother     Heart failure Brother     Heart failure Brother         Patient Care Team:  Rosemary Arnold MD as PCP - General (Internal Medicine)       Subjective:     Review of Systems   Constitutional:         Doesn't think he gained much   HENT: Negative.     Respiratory:  Positive for shortness of breath.         He gets winded if he walks 50-75 ft.   Cardiovascular:  Negative for chest pain.        Has had left shoulder pain for 2 weeks.    Gastrointestinal: Negative.    Genitourinary:  Positive for frequency.        Nocturia 4-5 times. He thinks it's the fluid pill that makes him urinate so much. Often he can't control it or move fast enough to get to bathroom   Musculoskeletal:  Positive for joint pain.        It comes and goes in the left shoulder, if he moves arm it makes it better.    Skin: Negative.    Neurological:         Left arm is pulling up more, the hand is not flat any longer.    Psychiatric/Behavioral: Negative.         Patient Reported Health Risk Assessment  What [age] 70-79  Are you male or female?: Male  During the past four weeks, how much have you been bothered by emotional problems such as feeling anxious, depressed, irritable, sad, or downhearted and blue?: Not at all  During the past five weeks, has your physical and/or emotional health limited your social activities with family, friends, neighbors, or groups?: Not at all  During the past four weeks, how much bodily pain have you generally had?: Moderate pain  During the past four weeks, was someone available to help if you needed and wanted help?: Yes, as much as I wanted  During the past four weeks, what was the hardest physical activity you could do for at least two minutes?: Light  Can you get to places out of walking distance without help?  (For  example, can you travel alone on buses or taxis, or drive your own car?): Yes  Can you go shopping for groceries or clothes without someone's help?: No  Can you prepare your own meals?: No  Can you do your own housework without help?: No  Because of any health problems, do you need the help of another person with your personal care needs such as eating, bathing, dressing, or getting around the house?: No  Can you handle your own money without help?: Yes  During the past four weeks, how would you rate your health in general?: Good  How have things been going for you during the past four weeks?: Pretty well  Are you having difficulties driving your car?: No  Do you always fasten your seat belt when you are in a car?: Yes, usually  How often in the past four weeks have you been bothered by falling or dizzy when standing up?: Never  How often in the past four weeks have you been bothered by sexual problems?: Never  How often in the past four weeks have you been bothered by trouble eating well?: Never  How often in the past four weeks have you been bothered by teeth or denture problems?: Never  How often in the past four weeks have you been bothered with problems using the telephone?: Never  How often in the past four weeks have you been bothered by tiredness or fatigue?: Sometimes  Have you fallen two or more times in the past year?: No  Are you afraid of falling?: No  Are you a smoker?: Yes, I'm not ready to quit  During the past four weeks, how many drinks of wine, beer, or other alcoholic beverages did you have?: No alcohol at all  Do you exercise for about 20 minutes three or more days a week?: No, I usually do not exercise this much  Have you been given any information to help you with hazards in your house that might hurt you?: No  Have you been given any information to help you with keeping track of your medications?: No  How often do you have trouble taking medicines the way you've been told to take them?: I  always take them as prescribed  How confident are you that you can control and manage most of your health problems?: Somewhat confident  What is your race? (Check all that apply.):     Objective:     BP (!) 150/91   Pulse (!) 59   Temp 98.3 °F (36.8 °C)   Resp 18   Ht 5' (1.524 m)   Wt 90.3 kg (199 lb)   BMI 38.86 kg/m²     Physical Exam  Vitals reviewed.   Constitutional:       General: He is not in acute distress.     Appearance: He is ill-appearing.   HENT:      Head: Normocephalic and atraumatic.      Right Ear: Tympanic membrane, ear canal and external ear normal.      Left Ear: Tympanic membrane, ear canal and external ear normal.      Mouth/Throat:      Mouth: Mucous membranes are moist.      Pharynx: No oropharyngeal exudate or posterior oropharyngeal erythema.   Eyes:      General: No scleral icterus.     Extraocular Movements: Extraocular movements intact.      Conjunctiva/sclera: Conjunctivae normal.      Pupils: Pupils are equal, round, and reactive to light.   Neck:      Vascular: No carotid bruit.   Cardiovascular:      Rate and Rhythm: Regular rhythm. Bradycardia present.      Heart sounds: Murmur heard.   Pulmonary:      Breath sounds: No wheezing or rales.   Abdominal:      Palpations: Abdomen is soft.      Tenderness: There is no abdominal tenderness. There is no guarding.   Musculoskeletal:         General: Tenderness present.      Right lower leg: Edema present.      Left lower leg: Edema present.      Comments: Poor ROM on the left shoulder, there is contracture belwo,k hand is turning medially    Lymphadenopathy:      Cervical: No cervical adenopathy.   Skin:     General: Skin is dry.      Findings: No bruising.   Neurological:      Mental Status: He is alert. Mental status is at baseline.      Motor: Weakness present.      Coordination: Coordination abnormal.      Gait: Gait abnormal.      Deep Tendon Reflexes: Reflexes abnormal.   Psychiatric:         Mood and Affect:  Mood normal.         Behavior: Behavior normal.         Thought Content: Thought content normal.      Comments: Poor judgment regarding health issues         No flowsheet data found.  Fall Risk Assessment - Outpatient 7/19/2023 4/13/2023 8/24/2022 5/23/2022   Mobility Status Ambulatory w/ assistance Ambulatory w/ assistance Ambulatory w/ assistance Ambulatory w/ assistance   Number of falls 0 0 1 1   Identified as fall risk 1 1 1 1           Depression Screening  Over the past two weeks, has the patient felt down, depressed, or hopeless?: No  Over the past two weeks, has the patient felt little interest or pleasure in doing things?: No  Functional Ability/Safety Screening  Was the patient's timed Up & Go test unsteady or longer than 30 seconds?: No  Does the patient need help with phone, transportation, shopping, preparing meals, housework, laundry, meds, or managing money?: Yes  Does the patient's home have rugs in the hallway, lack grab bars in the bathroom, lack handrails on the stairs or have poor lighting?: No  Have you noticed any hearing difficulties?: No  Cognitive Function (Assessed through direct observation with due consideration of information obtained by way of patient reports and/or concerns raised by family, friends, caretakers, or others)    Does the patient repeat questions/statements in the same day?: No  Does the patient have trouble remembering the date, year, and time?: No  Does the patient have difficulty managing finances?: No  Does the patient have a decreased sense of direction?: No      Office Visit on 04/13/2023   Component Date Value    Sodium Level 04/13/2023 138     Potassium Level 04/13/2023 2.9 (L)     Chloride 04/13/2023 102     Carbon Dioxide 04/13/2023 27     Glucose Level 04/13/2023 88     Blood Urea Nitrogen 04/13/2023 18.6     Creatinine 04/13/2023 1.44 (H)     Calcium Level Total 04/13/2023 8.9     Protein Total 04/13/2023 7.3     Albumin Level 04/13/2023 3.6     Globulin  04/13/2023 3.7 (H)     Albumin/Globulin Ratio 04/13/2023 1.0 (L)     Bilirubin Total 04/13/2023 0.4     Alkaline Phosphatase 04/13/2023 74     Alanine Aminotransferase 04/13/2023 12     Aspartate Aminotransfera* 04/13/2023 15     eGFR 04/13/2023 51     Iron Binding Capacity Un* 04/13/2023 187     Iron Level 04/13/2023 75     Transferrin 04/13/2023 244     Iron Binding Capacity To* 04/13/2023 262     Iron Saturation 04/13/2023 29     WBC 04/13/2023 7.2     RBC 04/13/2023 4.48 (L)     Hgb 04/13/2023 13.1 (L)     Hct 04/13/2023 40.1 (L)     MCV 04/13/2023 89.5     MCH 04/13/2023 29.2     MCHC 04/13/2023 32.7 (L)     RDW 04/13/2023 14.1     Platelet 04/13/2023 215     MPV 04/13/2023 12.9 (H)     Neut % 04/13/2023 61.3     Lymph % 04/13/2023 25.6     Mono % 04/13/2023 10.1     Eos % 04/13/2023 2.5     Basophil % 04/13/2023 0.4     Lymph # 04/13/2023 1.83     Neut # 04/13/2023 4.39     Mono # 04/13/2023 0.72     Eos # 04/13/2023 0.18     Baso # 04/13/2023 0.03     IG# 04/13/2023 0.01     IG% 04/13/2023 0.1     Hep C Ab Interp 04/13/2023 Nonreactive        Assessment/Plan:     1. Medicare annual wellness visit, subsequent  Comments:  Limited literacy makes diet and medical compliance difficult, he avoids the diuretic with his urinary issues.   Orders:  -     Comprehensive Metabolic Panel  -     CBC Auto Differential  -     Lipid Panel  -     PSA, Screening  -     Urinalysis, Reflex to Urine Culture  -     Uric Acid    2. Uncontrolled hypertension  Comments:  Encouraged to do the diuretic, will get him on something for BPH to try to help the frequency that is hard for him to manage on diuretic  Orders:  -     Comprehensive Metabolic Panel  -     Lipid Panel  -     Urinalysis, Reflex to Urine Culture  -     Ambulatory referral/consult to Cardiology    3. Chronic renal impairment, stage 3a  Comments:  Recheck with uric acid last creatinine 1.44 so it's been holding. He would not go to renal due to difficulty with  transportation  Orders:  -     Comprehensive Metabolic Panel  -     CBC Auto Differential  -     Urinalysis, Reflex to Urine Culture  -     Uric Acid    4. Noncompliance with treatment regimen  Comments:  Cost and low health literacy contribute    5. Chews tobacco    6. Illiterate  Comments:  Poor insight into health managment. He doesn't undertand the risk/benefit of his diuretics. He doesn't understand the risk of not seeing specialists.    7. Spastic hemiparesis of left nondominant side as late effect of cerebral infarction  Comments:  Worsening, tried to offer PT but he's unwilling  Orders:  -     Ambulatory referral/consult to Cardiology    8. Severe obesity (BMI 35.0-39.9) with comorbidity  Comments:  Activity is limited and cost of healthy diet is difficult for him to manage    9. Dyspnea on exertion  Comments:  I'd like him to do cardiac evaluation, he's had the stroke, his BP is poorly controlled he has high risk for CV disease  Orders:  -     Ambulatory referral/consult to Cardiology    10. Benign prostatic hyperplasia with nocturia  Comments:  Add Flomax    11. Overflow incontinence of urine  Comments:  Hopefully the Flomax will have him empty more completely.     12. Bradycardia, drug induced  Comments:  Likely from Coreg but he needs multiple medications for control and it's been tolerated better than past Beta Blockers.     13. Chronic left shoulder pain  Comments:  Most likely muscular from his spastic paralysis, will add baclofen    14. Prostate cancer screening  -     PSA, Screening    Other orders  -     tamsulosin (FLOMAX) 0.4 mg Cap; Take 1 capsule (0.4 mg total) by mouth once daily.  Dispense: 30 capsule; Refill: 11  -     baclofen (LIORESAL) 10 MG tablet; Take 1 tablet (10 mg total) by mouth 3 (three) times daily.  Dispense: 90 tablet; Refill: 11           Medicare Annual Wellness and Personalized Prevention Plan:   Fall Risk + Home Safety + Hearing Impairment + Depression Screen + Opioid and  Substance Abuse Screening + Cognitive Impairment Screen + Health Risk Assessment all reviewed.     Health Maintenance Topics with due status: Not Due       Topic Last Completion Date    Lipid Panel 06/25/2019    Influenza Vaccine 12/08/2021      The patient's Health Maintenance was reviewed and the following appears to be due at this time:   Health Maintenance Due   Topic Date Due    TETANUS VACCINE  Never done    High Dose Statin  Never done    Shingles Vaccine (1 of 2) Never done    Abdominal Aortic Aneurysm Screening  Never done    COVID-19 Vaccine (4 - Moderna series) 06/30/2022    Colorectal Cancer Screening  07/23/2022       Advance Care Planning   I attest to discussing Advance Care Planning with patient and/or family member.  Education was provided including the importance of the Health Care Power of , Advance Directives, and/or [place] documentation.  The patient expressed understanding to the importance of this information and discussion.     Advance Care Planning     Date: 07/19/2023  He doesn't have a living will or POA. He has trouble understanding the discussion. 10 min.         Follow up in about 3 months (around 10/19/2023) for Uncontrolled Diabetes. In addition to their scheduled follow up, the patient has also been instructed to follow up on as needed basis.

## 2023-07-20 ENCOUNTER — TELEPHONE (OUTPATIENT)
Dept: PRIMARY CARE CLINIC | Facility: CLINIC | Age: 73
End: 2023-07-20
Payer: MEDICARE

## 2023-07-20 DIAGNOSIS — N18.31 CHRONIC KIDNEY DISEASE, STAGE 3A: ICD-10-CM

## 2023-07-20 DIAGNOSIS — R77.1 ELEVATED SERUM GLOBULIN LEVEL: Primary | ICD-10-CM

## 2023-07-20 NOTE — TELEPHONE ENCOUNTER
----- Message from Violet Li sent at 7/20/2023  4:21 PM CDT -----  Regarding: pt ret call  [phone]    Type:  Patient Returning Call    Who Called:Susan   Who Left Message for Patient:missed call  Does the patient know what this is regarding?:  Would the patient rather a call back or a response via [facility]?   Best Call Back Number:[phone]  Additional Information: please call daughter back.

## 2023-07-20 NOTE — TELEPHONE ENCOUNTER
----- Message from Rosemary Arnold MD sent at 7/20/2023  3:50 PM CDT -----  He is a little more anemic, his protein and globulin are up, can they add an SPEP? I'll put order in for future. His chrolesterol is a little high on the bad, his prostate is good. The kidney is a little worse but his blood looks dryer.

## 2023-07-21 LAB
IGA SERPL-MCNC: 426 MG/DL (ref 101–645)
IGG SERPL-MCNC: 2048 MG/DL (ref 540–1822)
IGM SERPL-MCNC: 60 MG/DL (ref 22–240)
KAPPA LC FREE SER-MCNC: 5.83 MG/DL (ref 0.33–1.94)
KAPPA LC FREE/LAMBDA FREE SER: 1.84 {RATIO} (ref 0.26–1.65)
LAMBDA LC FREE SERPL-MCNC: 3.17 MG/DL (ref 0.57–2.63)

## 2023-09-07 RX ORDER — MONTELUKAST SODIUM 10 MG/1
10 TABLET ORAL NIGHTLY
Qty: 30 TABLET | Refills: 2 | Status: SHIPPED | OUTPATIENT
Start: 2023-09-07

## 2023-09-10 RX ORDER — POTASSIUM CHLORIDE 20 MEQ/15ML
20 SOLUTION ORAL DAILY
Qty: 473 ML | Refills: 2 | Status: SHIPPED | OUTPATIENT
Start: 2023-09-10 | End: 2024-03-08

## 2023-10-03 ENCOUNTER — PATIENT OUTREACH (OUTPATIENT)
Dept: ADMINISTRATIVE | Facility: HOSPITAL | Age: 73
End: 2023-10-03
Payer: MEDICARE

## 2023-10-04 ENCOUNTER — PATIENT OUTREACH (OUTPATIENT)
Dept: ADMINISTRATIVE | Facility: HOSPITAL | Age: 73
End: 2023-10-04
Payer: MEDICARE

## 2023-11-15 ENCOUNTER — OFFICE VISIT (OUTPATIENT)
Dept: PRIMARY CARE CLINIC | Facility: CLINIC | Age: 73
End: 2023-11-15
Payer: MEDICARE

## 2023-11-15 VITALS
WEIGHT: 191.63 LBS | RESPIRATION RATE: 16 BRPM | HEIGHT: 60 IN | HEART RATE: 62 BPM | OXYGEN SATURATION: 99 % | BODY MASS INDEX: 37.62 KG/M2 | DIASTOLIC BLOOD PRESSURE: 80 MMHG | TEMPERATURE: 99 F | SYSTOLIC BLOOD PRESSURE: 133 MMHG

## 2023-11-15 DIAGNOSIS — I69.952 SPASTIC HEMIPARESIS OF LEFT DOMINANT SIDE AS LATE EFFECT OF CEREBROVASCULAR DISEASE: ICD-10-CM

## 2023-11-15 DIAGNOSIS — R77.1 ELEVATED SERUM GLOBULIN LEVEL: ICD-10-CM

## 2023-11-15 DIAGNOSIS — N18.31 CHRONIC KIDNEY DISEASE, STAGE 3A: ICD-10-CM

## 2023-11-15 DIAGNOSIS — G89.29 CHRONIC PAIN OF LEFT KNEE: Primary | ICD-10-CM

## 2023-11-15 DIAGNOSIS — M25.562 CHRONIC PAIN OF LEFT KNEE: Primary | ICD-10-CM

## 2023-11-15 DIAGNOSIS — I10 ESSENTIAL HYPERTENSION: ICD-10-CM

## 2023-11-15 DIAGNOSIS — E87.6 HYPOKALEMIA: ICD-10-CM

## 2023-11-15 LAB
ANION GAP SERPL CALC-SCNC: 12 MEQ/L
BUN SERPL-MCNC: 20.9 MG/DL (ref 8.4–25.7)
CALCIUM SERPL-MCNC: 8.6 MG/DL (ref 8.8–10)
CHLORIDE SERPL-SCNC: 100 MMOL/L (ref 98–107)
CO2 SERPL-SCNC: 27 MMOL/L (ref 23–31)
CREAT SERPL-MCNC: 1.55 MG/DL (ref 0.73–1.18)
CREAT/UREA NIT SERPL: 13
GFR SERPLBLD CREATININE-BSD FMLA CKD-EPI: 47 MLS/MIN/1.73/M2
GLUCOSE SERPL-MCNC: 99 MG/DL (ref 82–115)
IGA SERPL-MCNC: 525 MG/DL (ref 101–645)
IGG SERPL-MCNC: 2236 MG/DL (ref 540–1822)
IGM SERPL-MCNC: 63 MG/DL (ref 22–240)
POTASSIUM SERPL-SCNC: 3.4 MMOL/L (ref 3.5–5.1)
SODIUM SERPL-SCNC: 139 MMOL/L (ref 136–145)

## 2023-11-15 PROCEDURE — 80048 BASIC METABOLIC PNL TOTAL CA: CPT | Performed by: INTERNAL MEDICINE

## 2023-11-15 PROCEDURE — 82784 ASSAY IGA/IGD/IGG/IGM EACH: CPT | Performed by: INTERNAL MEDICINE

## 2023-11-15 PROCEDURE — 3008F PR BODY MASS INDEX (BMI) DOCUMENTED: ICD-10-PCS | Mod: CPTII,,, | Performed by: INTERNAL MEDICINE

## 2023-11-15 PROCEDURE — 86334 IMMUNOFIX E-PHORESIS SERUM: CPT | Performed by: INTERNAL MEDICINE

## 2023-11-15 PROCEDURE — 1159F PR MEDICATION LIST DOCUMENTED IN MEDICAL RECORD: ICD-10-PCS | Mod: CPTII,,, | Performed by: INTERNAL MEDICINE

## 2023-11-15 PROCEDURE — 99214 PR OFFICE/OUTPT VISIT, EST, LEVL IV, 30-39 MIN: ICD-10-PCS | Mod: ,,, | Performed by: INTERNAL MEDICINE

## 2023-11-15 PROCEDURE — 3079F PR MOST RECENT DIASTOLIC BLOOD PRESSURE 80-89 MM HG: ICD-10-PCS | Mod: CPTII,,, | Performed by: INTERNAL MEDICINE

## 2023-11-15 PROCEDURE — 99214 OFFICE O/P EST MOD 30 MIN: CPT | Mod: ,,, | Performed by: INTERNAL MEDICINE

## 2023-11-15 PROCEDURE — 1125F PR PAIN SEVERITY QUANTIFIED, PAIN PRESENT: ICD-10-PCS | Mod: CPTII,,, | Performed by: INTERNAL MEDICINE

## 2023-11-15 PROCEDURE — 1125F AMNT PAIN NOTED PAIN PRSNT: CPT | Mod: CPTII,,, | Performed by: INTERNAL MEDICINE

## 2023-11-15 PROCEDURE — 3288F PR FALLS RISK ASSESSMENT DOCUMENTED: ICD-10-PCS | Mod: CPTII,,, | Performed by: INTERNAL MEDICINE

## 2023-11-15 PROCEDURE — 3075F PR MOST RECENT SYSTOLIC BLOOD PRESS GE 130-139MM HG: ICD-10-PCS | Mod: CPTII,,, | Performed by: INTERNAL MEDICINE

## 2023-11-15 PROCEDURE — 1159F MED LIST DOCD IN RCRD: CPT | Mod: CPTII,,, | Performed by: INTERNAL MEDICINE

## 2023-11-15 PROCEDURE — 84165 PROTEIN E-PHORESIS SERUM: CPT | Performed by: INTERNAL MEDICINE

## 2023-11-15 PROCEDURE — 3075F SYST BP GE 130 - 139MM HG: CPT | Mod: CPTII,,, | Performed by: INTERNAL MEDICINE

## 2023-11-15 PROCEDURE — 1160F RVW MEDS BY RX/DR IN RCRD: CPT | Mod: CPTII,,, | Performed by: INTERNAL MEDICINE

## 2023-11-15 PROCEDURE — 1101F PR PT FALLS ASSESS DOC 0-1 FALLS W/OUT INJ PAST YR: ICD-10-PCS | Mod: CPTII,,, | Performed by: INTERNAL MEDICINE

## 2023-11-15 PROCEDURE — 3008F BODY MASS INDEX DOCD: CPT | Mod: CPTII,,, | Performed by: INTERNAL MEDICINE

## 2023-11-15 PROCEDURE — 1101F PT FALLS ASSESS-DOCD LE1/YR: CPT | Mod: CPTII,,, | Performed by: INTERNAL MEDICINE

## 2023-11-15 PROCEDURE — 36415 COLL VENOUS BLD VENIPUNCTURE: CPT | Performed by: INTERNAL MEDICINE

## 2023-11-15 PROCEDURE — 3079F DIAST BP 80-89 MM HG: CPT | Mod: CPTII,,, | Performed by: INTERNAL MEDICINE

## 2023-11-15 PROCEDURE — 3288F FALL RISK ASSESSMENT DOCD: CPT | Mod: CPTII,,, | Performed by: INTERNAL MEDICINE

## 2023-11-15 PROCEDURE — 1160F PR REVIEW ALL MEDS BY PRESCRIBER/CLIN PHARMACIST DOCUMENTED: ICD-10-PCS | Mod: CPTII,,, | Performed by: INTERNAL MEDICINE

## 2023-11-15 PROCEDURE — 83521 IG LIGHT CHAINS FREE EACH: CPT | Mod: 59 | Performed by: INTERNAL MEDICINE

## 2023-11-15 RX ORDER — ALBUTEROL SULFATE 90 UG/1
2 AEROSOL, METERED RESPIRATORY (INHALATION) EVERY 6 HOURS PRN
Qty: 8 G | Refills: 11 | Status: SHIPPED | OUTPATIENT
Start: 2023-11-15

## 2023-11-15 RX ORDER — PREDNISONE 10 MG/1
10 TABLET ORAL DAILY
Qty: 10 TABLET | Refills: 0 | Status: SHIPPED | OUTPATIENT
Start: 2023-11-15

## 2023-11-15 NOTE — PROGRESS NOTES
Rosemary Arnold MD   [id number] [id number]     Patient ID: [id number]     Chief Complaint: Left knee pain.        HPI:     Cody Agustin is a 73 y.o. male here today for left knee pain. He has been limping more and it's hurting. He is using a topical rub on it and it helps some. He notes that the liquid medicine helps him more.       Subjective:     Review of Systems   Respiratory:  Positive for shortness of breath.         Gets SOB if he walks much, inhalers help.    Cardiovascular:         Thinks BP is doing fine   Musculoskeletal:  Positive for joint pain and myalgias.       Past Medical History:   Diagnosis Date    Chronic renal failure     CVA (cerebral vascular accident)     HTN (hypertension)     Illiterate     Left spastic hemiplegia         Past Surgical History:   Procedure Laterality Date    FOOT ARTHRODESIS W/ OLGA-BRANCHEAU ARTHROEREISIS IMPLANT Right 08/13/2015       Family History   Problem Relation Age of Onset    Stroke Mother     Heart attack Father     Diabetes Sister     Kidney failure Sister     Heart failure Brother     Hypertension Brother     Stroke Brother     Heart failure Brother     Heart failure Brother         Social History     Socioeconomic History    Marital status: Single   Tobacco Use    Smoking status: Never    Smokeless tobacco: Current     Types: Snuff   Substance and Sexual Activity    Alcohol use: Not Currently    Drug use: Never    Sexual activity: Not Currently       Review of patient's allergies indicates:  No Known Allergies    Outpatient Medications Marked as Taking for the 11/15/23 encounter (Office Visit) with Rosemary Arnold MD   Medication Sig Dispense Refill    amLODIPine (NORVASC) 10 MG tablet Take 1 tablet (10 mg total) by mouth once daily. 30 tablet 5    baclofen (LIORESAL) 10 MG tablet Take 1 tablet (10 mg total) by mouth 3 (three) times daily. 90 tablet 11    carvediloL (COREG) 12.5 MG tablet Take 1 tablet (12.5 mg total) by mouth 2 (two) times daily  with meals. 60 tablet 5    gabapentin (NEURONTIN) 600 MG tablet Take 1 tablet (600 mg total) by mouth 3 (three) times daily. 90 tablet 5    hydroCHLOROthiazide (HYDRODIURIL) 25 MG tablet Take 1 tablet (25 mg total) by mouth once daily. 30 tablet 5    potassium chloride 10% (KAYCIEL) 20 mEq/15 mL oral solution Take 15 mLs (20 mEq total) by mouth once daily. 473 mL 2    pravastatin (PRAVACHOL) 20 MG tablet Take 1 tablet (20 mg total) by mouth every evening. 30 tablet 5    tamsulosin (FLOMAX) 0.4 mg Cap Take 1 capsule (0.4 mg total) by mouth once daily. 30 capsule 11       Patient Care Team:  Rosemary Arnold MD as PCP - General (Internal Medicine)  Chiquis Pineda LPN as Licensed Practical Nurse       Objective:     /80   Pulse 62   Temp 98.8 °F (37.1 °C) (Oral)   Resp 16   Ht 5' (1.524 m)   Wt 86.9 kg (191 lb 9.6 oz)   SpO2 99%   BMI 37.42 kg/m²     Physical Exam  Cardiovascular:      Rate and Rhythm: Normal rate and regular rhythm.   Pulmonary:      Effort: No respiratory distress.      Breath sounds: No wheezing.   Neurological:      Comments: R hemiparesis, walking with 4 prong cane, there is pain with palpation and crepitance on the left knee. It is rotating laterally               Assessment/Plan:     1. Chronic pain of left knee  Comments:  Injection might be faster but he hates needles so will hold off on ortho referral, he needs xray rotation concerning for severe OA  Orders:  -     X-Ray Knee 3 View Left; Future; Expected date: 11/15/2023    2. Spastic hemiparesis of left dominant side as late effect of cerebrovascular disease  Comments:  I would like him to go to PT as limping is straining the left knee but he refuses    3. Essential hypertension  Comments:  Good control today, hopefully potassium is good with the liquid, he tolerates it by mixing with Coke  Orders:  -     Basic Metabolic Panel    4. Chronic kidney disease, stage 3a  Comments:  Recheck today  Orders:  -     Basic Metabolic  Panel    5. Hypokalemia  -     Basic Metabolic Panel    6. Elevated serum globulin level  Comments:  We never got a report on the SPEP just the levels, need to repeat, it was at [facility] will try internal lab  Orders:  -     Cancel: Immunofixation & Protein Electrophoresis & Immunoglobulins; Future; Expected date: 11/16/2023  -     Immunofixation & Protein Electrophoresis & Immunoglobulins    Other orders  -     albuterol (PROVENTIL HFA) 90 mcg/actuation inhaler; Inhale 2 puffs into the lungs every 6 (six) hours as needed for Wheezing. Rescue  Dispense: 8 g; Refill: 11  -     predniSONE (DELTASONE) 10 MG tablet; Take 1 tablet (10 mg total) by mouth once daily. Take with food  Dispense: 10 tablet; Refill: 0             Follow up in about 6 months (around 5/15/2024) for Wellness. In addition to their scheduled follow up, the patient has also been instructed to follow up on as needed basis.     Signature:  Rosemary Arnold MD  [facility]  [id number] [id number]

## 2023-11-16 ENCOUNTER — TELEPHONE (OUTPATIENT)
Dept: PRIMARY CARE CLINIC | Facility: CLINIC | Age: 73
End: 2023-11-16
Payer: MEDICARE

## 2023-11-16 LAB
ALBUMIN % SPEP (OHS): 34.4
ALBUMIN SERPL-MCNC: 2.6 G/DL (ref 3.4–4.8)
ALBUMIN/GLOB SERPL: 0.5 RATIO (ref 1.1–2)
ALPHA 1 GLOB (OHS): 0.41 GM/DL
ALPHA 1 GLOB% (OHS): 5.51
ALPHA 2 GLOB % (OHS): 13.36
ALPHA 2 GLOB (OHS): 1 GM/DL
BETA GLOB (OHS): 1.41 GM/DL
BETA GLOB% (OHS): 18.8
GAMMA GLOBULIN % (OHS): 27.93
GAMMA GLOBULIN (OHS): 2.1 GM/DL
GLOBULIN SER-MCNC: 4.9 GM/DL (ref 2.4–3.5)
M SPIKE % (OHS): ABNORMAL
M SPIKE (OHS): ABNORMAL
PATH REV: NORMAL
PROT SERPL-MCNC: 7.5 GM/DL (ref 5.8–7.6)

## 2023-11-16 NOTE — TELEPHONE ENCOUNTER
----- Message from Rosemary Arnold MD sent at 11/16/2023 11:05 AM CST -----  Potassium is better, almost normal. I won't change the medication, he's doing this one more consistently. His kidney went up so stay away from aspirin, Advil, Aleve, Ibuprofen and stay hydrated. We'll call if the protein numbers show a problem.

## 2023-11-17 LAB
KAPPA LC FREE SER-MCNC: 11.2 MG/DL (ref 0.33–1.94)
KAPPA LC FREE/LAMBDA FREE SER: 1.42 {RATIO} (ref 0.26–1.65)
LAMBDA LC FREE SERPL-MCNC: 7.87 MG/DL (ref 0.57–2.63)

## 2023-11-17 NOTE — TELEPHONE ENCOUNTER
----- Message from Rosemary Arnold MD sent at 11/16/2023  6:10 PM CST -----  Protein was good. It's just inflammation. If you've called the other results you don't have to call this.

## 2023-12-05 ENCOUNTER — HOSPITAL ENCOUNTER (EMERGENCY)
Facility: HOSPITAL | Age: 73
Discharge: HOME | End: 2023-12-05
Attending: EMERGENCY MEDICINE
Payer: MEDICARE

## 2023-12-05 VITALS
BODY MASS INDEX: 30.86 KG/M2 | RESPIRATION RATE: 20 BRPM | TEMPERATURE: 97 F | DIASTOLIC BLOOD PRESSURE: 71 MMHG | SYSTOLIC BLOOD PRESSURE: 140 MMHG | WEIGHT: 192 LBS | OXYGEN SATURATION: 98 % | HEIGHT: 66 IN | HEART RATE: 58 BPM

## 2023-12-05 DIAGNOSIS — I82.412 ACUTE DEEP VEIN THROMBOSIS (DVT) OF FEMORAL VEIN OF LEFT LOWER EXTREMITY: Primary | ICD-10-CM

## 2023-12-05 DIAGNOSIS — R52 PAIN: ICD-10-CM

## 2023-12-05 LAB
ALBUMIN SERPL-MCNC: 2.6 G/DL (ref 3.4–4.8)
ALBUMIN/GLOB SERPL: 0.5 RATIO (ref 1.1–2)
ALP SERPL-CCNC: 121 UNIT/L (ref 40–150)
ALT SERPL-CCNC: 32 UNIT/L (ref 0–55)
APTT PPP: 27.2 SECONDS (ref 23.2–33.7)
AST SERPL-CCNC: 32 UNIT/L (ref 5–34)
BASOPHILS # BLD AUTO: 0.02 X10(3)/MCL
BASOPHILS NFR BLD AUTO: 0.2 %
BILIRUB SERPL-MCNC: 0.6 MG/DL
BUN SERPL-MCNC: 36.6 MG/DL (ref 8.4–25.7)
CALCIUM SERPL-MCNC: 9 MG/DL (ref 8.8–10)
CHLORIDE SERPL-SCNC: 97 MMOL/L (ref 98–107)
CO2 SERPL-SCNC: 27 MMOL/L (ref 23–31)
CREAT SERPL-MCNC: 1.77 MG/DL (ref 0.73–1.18)
EOSINOPHIL # BLD AUTO: 0.12 X10(3)/MCL (ref 0–0.9)
EOSINOPHIL NFR BLD AUTO: 1.2 %
ERYTHROCYTE [DISTWIDTH] IN BLOOD BY AUTOMATED COUNT: 13.7 % (ref 11.5–17)
GFR SERPLBLD CREATININE-BSD FMLA CKD-EPI: 40 MLS/MIN/1.73/M2
GLOBULIN SER-MCNC: 5.7 GM/DL (ref 2.4–3.5)
GLUCOSE SERPL-MCNC: 99 MG/DL (ref 82–115)
HCT VFR BLD AUTO: 33.9 % (ref 42–52)
HGB BLD-MCNC: 10.8 G/DL (ref 14–18)
IMM GRANULOCYTES # BLD AUTO: 0.02 X10(3)/MCL (ref 0–0.04)
IMM GRANULOCYTES NFR BLD AUTO: 0.2 %
INR PPP: 1
LYMPHOCYTES # BLD AUTO: 1.32 X10(3)/MCL (ref 0.6–4.6)
LYMPHOCYTES NFR BLD AUTO: 13.5 %
MCH RBC QN AUTO: 28.3 PG (ref 27–31)
MCHC RBC AUTO-ENTMCNC: 31.9 G/DL (ref 33–36)
MCV RBC AUTO: 88.7 FL (ref 80–94)
MONOCYTES # BLD AUTO: 1.02 X10(3)/MCL (ref 0.1–1.3)
MONOCYTES NFR BLD AUTO: 10.5 %
NEUTROPHILS # BLD AUTO: 7.26 X10(3)/MCL (ref 2.1–9.2)
NEUTROPHILS NFR BLD AUTO: 74.4 %
PLATELET # BLD AUTO: 254 X10(3)/MCL (ref 130–400)
PMV BLD AUTO: 10.3 FL (ref 7.4–10.4)
POTASSIUM SERPL-SCNC: 2.9 MMOL/L (ref 3.5–5.1)
PROT SERPL-MCNC: 8.3 GM/DL (ref 5.8–7.6)
PROTHROMBIN TIME: 10.1 SECONDS (ref 12.5–14.5)
RBC # BLD AUTO: 3.82 X10(6)/MCL (ref 4.7–6.1)
SODIUM SERPL-SCNC: 135 MMOL/L (ref 136–145)
WBC # SPEC AUTO: 9.76 X10(3)/MCL (ref 4.5–11.5)

## 2023-12-05 PROCEDURE — 85730 THROMBOPLASTIN TIME PARTIAL: CPT | Performed by: NURSE PRACTITIONER

## 2023-12-05 PROCEDURE — 80053 COMPREHEN METABOLIC PANEL: CPT | Performed by: NURSE PRACTITIONER

## 2023-12-05 PROCEDURE — 85610 PROTHROMBIN TIME: CPT | Performed by: NURSE PRACTITIONER

## 2023-12-05 PROCEDURE — 85025 COMPLETE CBC W/AUTO DIFF WBC: CPT | Performed by: NURSE PRACTITIONER

## 2023-12-05 PROCEDURE — 99284 EMERGENCY DEPT VISIT MOD MDM: CPT | Mod: 25

## 2023-12-05 PROCEDURE — 25000003 PHARM REV CODE 250: Performed by: EMERGENCY MEDICINE

## 2023-12-05 RX ORDER — POTASSIUM CHLORIDE 20 MEQ/1
40 TABLET, EXTENDED RELEASE ORAL
Status: COMPLETED | OUTPATIENT
Start: 2023-12-05 | End: 2023-12-05

## 2023-12-05 RX ADMIN — RIVAROXABAN 10 MG: 10 TABLET, FILM COATED ORAL at 07:12

## 2023-12-05 RX ADMIN — POTASSIUM CHLORIDE 40 MEQ: 1500 TABLET, EXTENDED RELEASE ORAL at 05:12

## 2023-12-05 NOTE — ED NOTES
L leg swollen pulses palpable-pts sister states his L leg is always a little swollen but pain is new

## 2023-12-05 NOTE — ED PROVIDER NOTES
Encounter Date: 12/5/2023       History     Chief Complaint   Patient presents with    Leg Pain     Pt c/o an area of redness and swelling to the R upper leg x 2 weeks.  Denies wound     74 yo male with h/o stroke w/ left sided weakness, CRF, HTN presents accompanied by his daughter with c/o left inner thigh pain.  Onset x1 week ago.  Pt denies falling, straining, trauma.  Provocative factors include ambulating.  Palliative factors include rest.  He denies fever and systemic symptoms.    The history is provided by the patient. No  was used.     Review of patient's allergies indicates:  No Known Allergies  Past Medical History:   Diagnosis Date    Chronic renal failure     CVA (cerebral vascular accident)     HTN (hypertension)     Illiterate     Left spastic hemiplegia      Past Surgical History:   Procedure Laterality Date    FOOT ARTHRODESIS W/ OLGA-BRANCHEAU ARTHROEREISIS IMPLANT Right 08/13/2015     Family History   Problem Relation Age of Onset    Stroke Mother     Heart attack Father     Diabetes Sister     Kidney failure Sister     Heart failure Brother     Hypertension Brother     Stroke Brother     Heart failure Brother     Heart failure Brother      Social History     Tobacco Use    Smoking status: Never    Smokeless tobacco: Current     Types: Snuff   Substance Use Topics    Alcohol use: Not Currently    Drug use: Never     Review of Systems   Constitutional:  Negative for fever.   HENT:  Negative for sore throat.    Respiratory:  Negative for shortness of breath.    Cardiovascular:  Negative for chest pain.   Gastrointestinal:  Negative for nausea.   Genitourinary:  Negative for dysuria.   Musculoskeletal:  Negative for back pain.        Left inner thigh pain   Skin:  Negative for rash.   Neurological:  Negative for weakness.   Hematological:  Does not bruise/bleed easily.       Physical Exam     Initial Vitals [12/05/23 1401]   BP Pulse Resp Temp SpO2   125/64 (!) 50 18 97.4 °F  (36.3 °C) 97 %      MAP       --         Physical Exam    Constitutional: He appears well-developed and well-nourished.   HENT:   Head: Normocephalic and atraumatic.   Right Ear: External ear normal.   Left Ear: External ear normal.   Nose: Nose normal.   Mouth/Throat: Oropharynx is clear and moist.   Eyes: Conjunctivae and EOM are normal. Pupils are equal, round, and reactive to light.   Neck: Neck supple.   Normal range of motion.  Cardiovascular:  Normal rate and regular rhythm.           Pulmonary/Chest: Breath sounds normal.   Genitourinary:    Penis normal.     Musculoskeletal:         General: Normal range of motion.      Cervical back: Normal range of motion and neck supple.      Right upper leg: Normal.      Left upper leg: Edema and tenderness present.      Right lower leg: Normal.      Left lower leg: No deformity or tenderness. 2+ Edema present.        Legs:      Neurological: He is alert and oriented to person, place, and time. He has normal strength.   Skin: Skin is warm and dry.   Psychiatric: He has a normal mood and affect.         ED Course   Procedures  Labs Reviewed   COMPREHENSIVE METABOLIC PANEL - Abnormal; Notable for the following components:       Result Value    Sodium Level 135 (*)     Potassium Level 2.9 (*)     Chloride 97 (*)     Blood Urea Nitrogen 36.6 (*)     Creatinine 1.77 (*)     Protein Total 8.3 (*)     Albumin Level 2.6 (*)     Globulin 5.7 (*)     Albumin/Globulin Ratio 0.5 (*)     All other components within normal limits   PROTIME-INR - Abnormal; Notable for the following components:    PT 10.1 (*)     All other components within normal limits   CBC WITH DIFFERENTIAL - Abnormal; Notable for the following components:    RBC 3.82 (*)     Hgb 10.8 (*)     Hct 33.9 (*)     MCHC 31.9 (*)     All other components within normal limits   APTT - Normal   CBC W/ AUTO DIFFERENTIAL    Narrative:     The following orders were created for panel order CBC auto differential.  Procedure                                Abnormality         Status                     ---------                               -----------         ------                     CBC with Differential[[id number]]       Abnormal            Final result                 Please view results for these tests on the individual orders.          Imaging Results              US Lower Extremity Veins Left (Final result)  Result time 12/05/23 14:55:16      Final result by Sal Robertson MD (12/05/23 14:55:16)                   Impression:      Mostly occlusive thrombus extending from the left common femoral vein to the popliteal vein.    Sonographer reported these findings to Dr. Martínez at 1440 on 12/5/2023.      Electronically signed by: Sal Robertson  Date:    12/05/2023  Time:    14:55               Narrative:    EXAMINATION:  US LOWER EXTREMITY VEINS LEFT    CLINICAL HISTORY:  Pain, unspecified    COMPARISON:  None    FINDINGS:  Sonographic images with color and spectral analysis were obtained of the left lower extremity venous system.    There is mostly occlusive thrombus extending from the left common femoral vein to the popliteal vein.                                       Medications   rivaroxaban tablet 15 mg (has no administration in time range)   potassium chloride SA CR tablet 40 mEq (40 mEq Oral Given 12/5/23 1704)     Medical Decision Making  Differential diagnoses:  thigh contusion, DVT left leg, strain of left groin muscle    74 yo male with h/o stroke w/ left sided weakness, CRF, HTN presents accompanied by his daughter with c/o left inner thigh pain.  Onset x1 week ago.  Pt denies falling, straining, trauma.  Provocative factors include ambulating.  Palliative factors include rest.  He denies fever and systemic symptoms.  Pt denies scrotal pain and he has no scrotal swelling or tenderness.  He denies chest pain, dyspnea, abd pain, n/v/d, weakness, dizziness.  Physical exam as documented.  Patient has generalized edema to the  left leg and dorsalis pedis pulse to the left foot was obtained via Doppler.  Patient does have decreased mobility at home secondary to a stroke 17 years ago however, he does ambulate some with the assistance of a cane.  According to his daughter, he is not currently taking any blood thinners.    Amount and/or Complexity of Data Reviewed  Labs: ordered. Decision-making details documented in ED Course.  Radiology:  Decision-making details documented in ED Course.  Discussion of management or test interpretation with external provider(s): I assumed the care of this patient after initial evaluation by my nurse practitioner.  I agree with her history and physical and performed a history and physical myself.  He has extensive deep venous thrombosis in his left leg. I discussed the case with Dr Araceli Sellers our hospitalist and she and  I feel that referral for possible thrombectomy may be his best solution.  After consultation with [facility] via telehealth Cardiology Services Dr. Alvarado felt that due to the length of swelling being 2 weeks thrombectomy would not be feasible.  He suggested discharge on Xarelto and follow-up with him in the clinic in a few weeks    Risk  Prescription drug management.                                      Clinical Impression:  Final diagnoses:  [R52] Pain  [I82.412] Acute deep vein thrombosis (DVT) of femoral vein of left lower extremity (Primary)          ED Disposition Condition    Discharge Stable          ED Prescriptions       Medication Sig Dispense Start Date End Date Auth. Provider    rivaroxaban (XARELTO) 15 mg Tab Take 1 tablet (15 mg total) by mouth 2 (two) times daily with meals. for 21 days 42 tablet 12/5/2023 12/26/2023 Isidro Martínez MD          Follow-up Information       Follow up With Specialties Details Why Contact Info    Rosemary Arnold MD Internal Medicine Schedule an appointment as soon as possible for a visit   [address] [place] [id number]  [phone]       Luis Alvarado MD Cardiovascular Disease, Cardiology Schedule an appointment as soon as possible for a visit in 2 weeks  [address] [facility] [address]  [phone]               Isidro Martínez MD  12/05/23 [id number]

## 2023-12-06 ENCOUNTER — TELEPHONE (OUTPATIENT)
Dept: PRIMARY CARE CLINIC | Facility: CLINIC | Age: 73
End: 2023-12-06

## 2023-12-06 NOTE — CONSULTS
[facility] - Emergency Dept  Telecardiology  Consult Note    Patient Name: Cody Agustin  MRN: [id number]  Admission Date: 12/5/2023  Hospital Length of Stay: 0 days  Code Status: No Order   Attending Provider: Isidro Martínez MD   Consulting Provider: Roberto Desai [facility]  Primary Care Physician: Rosemary Arnold MD  Principal Problem:<principal problem not specified>    Telecardiology Consult  From: [facility] - Emergency Dept  DOS: 12/5/2023   Requesting Physician: Dr. Martínez   Reason for consult: LLE DVT   ER consult  Duration of consult: 36 minutes      Patient information was obtained from patient, relative(s), past medical records, and ER records.     Subjective:     Chief Complaint:  Consulted for LLE DVT     HPI:   This is a 73-year-old male, who is unknown to [facility], with a history of CVA with left-sided weakness, CKD, HTN, left spastic hemiplegia secondary to CVA.  He presented to the ER on 12.5.23 with complaints of left inner thigh pain and swelling that has been going on for approximately 2 weeks.  Patient denied any falls or trauma to the area.  Patient said the pain is worse with ambulating and is improved with rest.  Ultrasound of left lower extremity revealed mostly occlusive thrombus extending from the left common femoral vein to the popliteal vein.  [facility] has been consulted for left lower extremity DVT.      PMH: CVA with left-sided weakness, CKD, HTN, left spastic hemiplegia secondary to CVA  PSH: Foot arthrodesis with Magdy Brancheau arthoereisis implant  Social History: Current smokeless tobacco use, denies EtOH and illicit drug use  Family History: Mother-CVA; father-MI; sister-diabetes, renal failure; Brother-heart failure, HTN, CVA    Previous Cardiac Diagnostics:   No previous cardiac diagnostics available for review      Review of patient's allergies indicates:  No Known Allergies    No current facility-administered medications on file prior to encounter.      Current Outpatient Medications on File Prior to Encounter   Medication Sig    albuterol (PROVENTIL HFA) 90 mcg/actuation inhaler Inhale 2 puffs into the lungs every 6 (six) hours as needed for Wheezing. Rescue    amLODIPine (NORVASC) 10 MG tablet Take 1 tablet (10 mg total) by mouth once daily.    baclofen (LIORESAL) 10 MG tablet Take 1 tablet (10 mg total) by mouth 3 (three) times daily.    carvediloL (COREG) 12.5 MG tablet Take 1 tablet (12.5 mg total) by mouth 2 (two) times daily with meals.    gabapentin (NEURONTIN) 600 MG tablet Take 1 tablet (600 mg total) by mouth 3 (three) times daily.    hydroCHLOROthiazide (HYDRODIURIL) 25 MG tablet Take 1 tablet (25 mg total) by mouth once daily.    potassium chloride 10% (KAYCIEL) 20 mEq/15 mL oral solution Take 15 mLs (20 mEq total) by mouth once daily.    pravastatin (PRAVACHOL) 20 MG tablet Take 1 tablet (20 mg total) by mouth every evening.    predniSONE (DELTASONE) 10 MG tablet Take 1 tablet (10 mg total) by mouth once daily. Take with food    tamsulosin (FLOMAX) 0.4 mg Cap Take 1 capsule (0.4 mg total) by mouth once daily.       Review of Systems:  Review of Systems   Constitutional: Negative.    HENT: Negative.     Eyes: Negative.    Respiratory: Negative.     Cardiovascular:  Positive for leg swelling.   Gastrointestinal: Negative.    Endocrine: Negative.    Genitourinary: Negative.    Musculoskeletal:  Positive for joint swelling.   Skin: Negative.    Allergic/Immunologic: Negative.    Neurological: Negative.    Hematological: Negative.    Psychiatric/Behavioral: Negative.         Objective:     Vital Signs (Most Recent):  Temp: 97.4 °F (36.3 °C) (12/05/23 1401)  Pulse: (!) 58 (12/05/23 1807)  Resp: 20 (12/05/23 1807)  BP: (!) 140/71 (12/05/23 1807)  SpO2: 98 % (12/05/23 1807) Vital Signs (24h Range):  Temp:  [97.4 °F (36.3 °C)] 97.4 °F (36.3 °C)  Pulse:  [50-58] 58  Resp:  [18-20] 20  SpO2:  [97 %-98 %] 98 %  BP: (125-140)/(64-71) 140/71     Weight: 87.1 kg  (192 lb)  Body mass index is 30.86 kg/m².    SpO2: 98 %       No intake or output data in the 24 hours ending 12/05/23 1832    Lines/Drains/Airways       Peripheral Intravenous Line  Duration                  Peripheral IV - Single Lumen 09/04/22 1701 24 G Left;Posterior Hand 457 days         Peripheral IV - Single Lumen 12/05/23 1531 20 G Anterior;Distal;Right Upper Arm <1 day                      Significant Labs: CMP   Recent Labs   Lab 12/05/23  1457   *   K 2.9*   CO2 27   BUN 36.6*   CREATININE 1.77*   CALCIUM 9.0   ALBUMIN 2.6*   BILITOT 0.6   ALKPHOS 121   AST 32   ALT 32    and CBC   Recent Labs   Lab 12/05/23  1457   WBC 9.76   HGB 10.8*   HCT 33.9*            Significant Imaging:   Imaging Results              US Lower Extremity Veins Left (Final result)  Result time 12/05/23 14:55:16      Final result by Sal Robertson MD (12/05/23 14:55:16)                   Impression:      Mostly occlusive thrombus extending from the left common femoral vein to the popliteal vein.    Sonographer reported these findings to Dr. Martínez at 1440 on 12/5/2023.      Electronically signed by: Sal Robertson  Date:    12/05/2023  Time:    14:55               Narrative:    EXAMINATION:  US LOWER EXTREMITY VEINS LEFT    CLINICAL HISTORY:  Pain, unspecified    COMPARISON:  None    FINDINGS:  Sonographic images with color and spectral analysis were obtained of the left lower extremity venous system.    There is mostly occlusive thrombus extending from the left common femoral vein to the popliteal vein.                                        EKG:        Telemetry:  SR    Physical Exam:  Physical Exam  HENT:      Head: Atraumatic.   Eyes:      Extraocular Movements: Extraocular movements intact.   Cardiovascular:      Rate and Rhythm: Normal rate and regular rhythm.      Heart sounds: Normal heart sounds.   Pulmonary:      Effort: Pulmonary effort is normal.   Musculoskeletal:      Left lower leg: Edema present.    Neurological:      General: No focal deficit present.      Mental Status: He is alert and oriented to person, place, and time.   Psychiatric:         Mood and Affect: Mood normal.         Behavior: Behavior normal.         Home Medications:   No current facility-administered medications on file prior to encounter.     Current Outpatient Medications on File Prior to Encounter   Medication Sig Dispense Refill    albuterol (PROVENTIL HFA) 90 mcg/actuation inhaler Inhale 2 puffs into the lungs every 6 (six) hours as needed for Wheezing. Rescue 8 g 11    amLODIPine (NORVASC) 10 MG tablet Take 1 tablet (10 mg total) by mouth once daily. 30 tablet 5    baclofen (LIORESAL) 10 MG tablet Take 1 tablet (10 mg total) by mouth 3 (three) times daily. 90 tablet 11    carvediloL (COREG) 12.5 MG tablet Take 1 tablet (12.5 mg total) by mouth 2 (two) times daily with meals. 60 tablet 5    gabapentin (NEURONTIN) 600 MG tablet Take 1 tablet (600 mg total) by mouth 3 (three) times daily. 90 tablet 5    hydroCHLOROthiazide (HYDRODIURIL) 25 MG tablet Take 1 tablet (25 mg total) by mouth once daily. 30 tablet 5    potassium chloride 10% (KAYCIEL) 20 mEq/15 mL oral solution Take 15 mLs (20 mEq total) by mouth once daily. 473 mL 2    pravastatin (PRAVACHOL) 20 MG tablet Take 1 tablet (20 mg total) by mouth every evening. 30 tablet 5    predniSONE (DELTASONE) 10 MG tablet Take 1 tablet (10 mg total) by mouth once daily. Take with food 10 tablet 0    tamsulosin (FLOMAX) 0.4 mg Cap Take 1 capsule (0.4 mg total) by mouth once daily. 30 capsule 11       Current Inpatient Medications:  No current facility-administered medications for this encounter.    Current Outpatient Medications:     albuterol (PROVENTIL HFA) 90 mcg/actuation inhaler, Inhale 2 puffs into the lungs every 6 (six) hours as needed for Wheezing. Rescue, Disp: 8 g, Rfl: 11    amLODIPine (NORVASC) 10 MG tablet, Take 1 tablet (10 mg total) by mouth once daily., Disp: 30 tablet, Rfl:  5    baclofen (LIORESAL) 10 MG tablet, Take 1 tablet (10 mg total) by mouth 3 (three) times daily., Disp: 90 tablet, Rfl: 11    carvediloL (COREG) 12.5 MG tablet, Take 1 tablet (12.5 mg total) by mouth 2 (two) times daily with meals., Disp: 60 tablet, Rfl: 5    gabapentin (NEURONTIN) 600 MG tablet, Take 1 tablet (600 mg total) by mouth 3 (three) times daily., Disp: 90 tablet, Rfl: 5    hydroCHLOROthiazide (HYDRODIURIL) 25 MG tablet, Take 1 tablet (25 mg total) by mouth once daily., Disp: 30 tablet, Rfl: 5    potassium chloride 10% (KAYCIEL) 20 mEq/15 mL oral solution, Take 15 mLs (20 mEq total) by mouth once daily., Disp: 473 mL, Rfl: 2    pravastatin (PRAVACHOL) 20 MG tablet, Take 1 tablet (20 mg total) by mouth every evening., Disp: 30 tablet, Rfl: 5    predniSONE (DELTASONE) 10 MG tablet, Take 1 tablet (10 mg total) by mouth once daily. Take with food, Disp: 10 tablet, Rfl: 0    tamsulosin (FLOMAX) 0.4 mg Cap, Take 1 capsule (0.4 mg total) by mouth once daily., Disp: 30 capsule, Rfl: 11           Assessment:     IMPRESSION:  LLE mostly occlusive DVT  CVA with left sided weakness and spastic hemiplegia  CKD  HTN  Smokeless tobacco use      PLAN:     PLAN:  Okay to DC home  Start Xarelto 15mg BID x 21 days, then 20mg daily  Continue home medications.  F/U with PCP  F/U with [facility] [place] [facility] in 1-2 weeks    Case and plan discussed with Dr. Alvarado      Thank you for your consult.     Roberto Desai, [facility]-BC  Cardiology  [facility] Emergency Dept  12/05/2023 6:32 PM

## 2023-12-06 NOTE — TELEPHONE ENCOUNTER
In ER with large DVT in his left leg. They started Xarelto and sent home. We need him in here, he will be a disaster with blood thinners, I am not sure he can afford that one but there is no way he'd be compliant with Coumadin restrictions and monitoring.   He's to follow up at [facility] in 1-2 weeks he has got to make that appointment.

## 2023-12-12 ENCOUNTER — TELEPHONE (OUTPATIENT)
Dept: PRIMARY CARE CLINIC | Facility: CLINIC | Age: 73
End: 2023-12-12

## 2023-12-12 NOTE — TELEPHONE ENCOUNTER
PT CONFIRMED APPT     1. Are there any outstanding tasks in the patient's chart? (Ex. Labs, MMG)?-    2. Do we have any outstanding/Pending referrals?-    3. Has the patient been seen in an ER, Urgent Care or been admitted to the hospital since last office visit with our office? ER    4. Has the patient seen any other health care provider (doctors) since last visit?-    5. Has the patient had any blood work or x-rays done since last visit?-    QUALITY-DO NOT ASK PATIENT    -PNEUMONIA  13:12/15/20  20:-  23:2/23/22    -COLON:7/23/19    -DEXA:-    -DIABETES SCREEN  A1C:-  MICRO UA:-  EYE EXAM:-  FOOT EXAM:-

## 2023-12-19 ENCOUNTER — OFFICE VISIT (OUTPATIENT)
Dept: PRIMARY CARE CLINIC | Facility: CLINIC | Age: 73
End: 2023-12-19
Payer: MEDICARE

## 2023-12-19 ENCOUNTER — TELEPHONE (OUTPATIENT)
Dept: PRIMARY CARE CLINIC | Facility: CLINIC | Age: 73
End: 2023-12-19
Payer: MEDICARE

## 2023-12-19 VITALS
WEIGHT: 191 LBS | OXYGEN SATURATION: 98 % | HEIGHT: 66 IN | SYSTOLIC BLOOD PRESSURE: 123 MMHG | DIASTOLIC BLOOD PRESSURE: 64 MMHG | RESPIRATION RATE: 16 BRPM | BODY MASS INDEX: 30.7 KG/M2 | TEMPERATURE: 97 F | HEART RATE: 55 BPM

## 2023-12-19 DIAGNOSIS — Z79.01 ANTICOAGULATED: ICD-10-CM

## 2023-12-19 DIAGNOSIS — I82.412 ACUTE DEEP VEIN THROMBOSIS (DVT) OF FEMORAL VEIN OF LEFT LOWER EXTREMITY: Primary | ICD-10-CM

## 2023-12-19 DIAGNOSIS — R53.1 WEAKNESS: ICD-10-CM

## 2023-12-19 LAB
ANION GAP SERPL CALC-SCNC: 10 MEQ/L
BASOPHILS # BLD AUTO: 0.02 X10(3)/MCL
BASOPHILS NFR BLD AUTO: 0.3 %
BUN SERPL-MCNC: 23.6 MG/DL (ref 8.4–25.7)
CALCIUM SERPL-MCNC: 8.8 MG/DL (ref 8.8–10)
CHLORIDE SERPL-SCNC: 102 MMOL/L (ref 98–107)
CO2 SERPL-SCNC: 26 MMOL/L (ref 23–31)
CREAT SERPL-MCNC: 1.31 MG/DL (ref 0.73–1.18)
CREAT/UREA NIT SERPL: 18
EOSINOPHIL # BLD AUTO: 0.19 X10(3)/MCL (ref 0–0.9)
EOSINOPHIL NFR BLD AUTO: 2.7 %
ERYTHROCYTE [DISTWIDTH] IN BLOOD BY AUTOMATED COUNT: 15.4 % (ref 11.5–17)
GFR SERPLBLD CREATININE-BSD FMLA CKD-EPI: 57 MLS/MIN/1.73/M2
GLUCOSE SERPL-MCNC: 98 MG/DL (ref 82–115)
HCT VFR BLD AUTO: 32.3 % (ref 42–52)
HGB BLD-MCNC: 10.3 G/DL (ref 14–18)
IMM GRANULOCYTES # BLD AUTO: 0.01 X10(3)/MCL (ref 0–0.04)
IMM GRANULOCYTES NFR BLD AUTO: 0.1 %
LYMPHOCYTES # BLD AUTO: 1.87 X10(3)/MCL (ref 0.6–4.6)
LYMPHOCYTES NFR BLD AUTO: 26.2 %
MCH RBC QN AUTO: 28.8 PG (ref 27–31)
MCHC RBC AUTO-ENTMCNC: 31.9 G/DL (ref 33–36)
MCV RBC AUTO: 90.2 FL (ref 80–94)
MONOCYTES # BLD AUTO: 0.63 X10(3)/MCL (ref 0.1–1.3)
MONOCYTES NFR BLD AUTO: 8.8 %
NEUTROPHILS # BLD AUTO: 4.41 X10(3)/MCL (ref 2.1–9.2)
NEUTROPHILS NFR BLD AUTO: 61.9 %
PLATELET # BLD AUTO: 365 X10(3)/MCL (ref 130–400)
PMV BLD AUTO: 10.8 FL (ref 7.4–10.4)
POTASSIUM SERPL-SCNC: 3.2 MMOL/L (ref 3.5–5.1)
RBC # BLD AUTO: 3.58 X10(6)/MCL (ref 4.7–6.1)
SODIUM SERPL-SCNC: 138 MMOL/L (ref 136–145)
WBC # SPEC AUTO: 7.13 X10(3)/MCL (ref 4.5–11.5)

## 2023-12-19 PROCEDURE — 36415 COLL VENOUS BLD VENIPUNCTURE: CPT | Performed by: INTERNAL MEDICINE

## 2023-12-19 PROCEDURE — 3288F FALL RISK ASSESSMENT DOCD: CPT | Mod: CPTII,,, | Performed by: INTERNAL MEDICINE

## 2023-12-19 PROCEDURE — 3288F PR FALLS RISK ASSESSMENT DOCUMENTED: ICD-10-PCS | Mod: CPTII,,, | Performed by: INTERNAL MEDICINE

## 2023-12-19 PROCEDURE — 1159F MED LIST DOCD IN RCRD: CPT | Mod: CPTII,,, | Performed by: INTERNAL MEDICINE

## 2023-12-19 PROCEDURE — 3078F DIAST BP <80 MM HG: CPT | Mod: CPTII,,, | Performed by: INTERNAL MEDICINE

## 2023-12-19 PROCEDURE — 3074F SYST BP LT 130 MM HG: CPT | Mod: CPTII,,, | Performed by: INTERNAL MEDICINE

## 2023-12-19 PROCEDURE — 1160F PR REVIEW ALL MEDS BY PRESCRIBER/CLIN PHARMACIST DOCUMENTED: ICD-10-PCS | Mod: CPTII,,, | Performed by: INTERNAL MEDICINE

## 2023-12-19 PROCEDURE — 36415 COLL VENOUS BLD VENIPUNCTURE: CPT | Mod: ,,, | Performed by: INTERNAL MEDICINE

## 2023-12-19 PROCEDURE — 1159F PR MEDICATION LIST DOCUMENTED IN MEDICAL RECORD: ICD-10-PCS | Mod: CPTII,,, | Performed by: INTERNAL MEDICINE

## 2023-12-19 PROCEDURE — 1160F RVW MEDS BY RX/DR IN RCRD: CPT | Mod: CPTII,,, | Performed by: INTERNAL MEDICINE

## 2023-12-19 PROCEDURE — 99214 OFFICE O/P EST MOD 30 MIN: CPT | Mod: ,,, | Performed by: INTERNAL MEDICINE

## 2023-12-19 PROCEDURE — 3078F PR MOST RECENT DIASTOLIC BLOOD PRESSURE < 80 MM HG: ICD-10-PCS | Mod: CPTII,,, | Performed by: INTERNAL MEDICINE

## 2023-12-19 PROCEDURE — 1101F PR PT FALLS ASSESS DOC 0-1 FALLS W/OUT INJ PAST YR: ICD-10-PCS | Mod: CPTII,,, | Performed by: INTERNAL MEDICINE

## 2023-12-19 PROCEDURE — 3008F PR BODY MASS INDEX (BMI) DOCUMENTED: ICD-10-PCS | Mod: CPTII,,, | Performed by: INTERNAL MEDICINE

## 2023-12-19 PROCEDURE — 3074F PR MOST RECENT SYSTOLIC BLOOD PRESSURE < 130 MM HG: ICD-10-PCS | Mod: CPTII,,, | Performed by: INTERNAL MEDICINE

## 2023-12-19 PROCEDURE — 99214 PR OFFICE/OUTPT VISIT, EST, LEVL IV, 30-39 MIN: ICD-10-PCS | Mod: ,,, | Performed by: INTERNAL MEDICINE

## 2023-12-19 PROCEDURE — 80048 BASIC METABOLIC PNL TOTAL CA: CPT | Performed by: INTERNAL MEDICINE

## 2023-12-19 PROCEDURE — 1101F PT FALLS ASSESS-DOCD LE1/YR: CPT | Mod: CPTII,,, | Performed by: INTERNAL MEDICINE

## 2023-12-19 PROCEDURE — 36415 PR COLLECTION VENOUS BLOOD,VENIPUNCTURE: ICD-10-PCS | Mod: ,,, | Performed by: INTERNAL MEDICINE

## 2023-12-19 PROCEDURE — 85025 COMPLETE CBC W/AUTO DIFF WBC: CPT | Performed by: INTERNAL MEDICINE

## 2023-12-19 PROCEDURE — 1126F AMNT PAIN NOTED NONE PRSNT: CPT | Mod: CPTII,,, | Performed by: INTERNAL MEDICINE

## 2023-12-19 PROCEDURE — 3008F BODY MASS INDEX DOCD: CPT | Mod: CPTII,,, | Performed by: INTERNAL MEDICINE

## 2023-12-19 PROCEDURE — 1126F PR PAIN SEVERITY QUANTIFIED, NO PAIN PRESENT: ICD-10-PCS | Mod: CPTII,,, | Performed by: INTERNAL MEDICINE

## 2023-12-19 RX ORDER — VITAMIN A ACETATE, .ALPHA.-TOCOPHEROL ACETATE, ASCORBIC ACID, THIAMINE MONONITRATE, RIBOFLAVIN, NIACINAMIDE, PYRIDOXINE HYDROCHLORIDE, BIOTIN, CALCIUM PANTOTHENATE, FOLIC ACID, CYANOCOBALAMIN, FLAVONE, FERROUS FUMARATE, CHROMIC CHLORIDE CR-51, MAGNESIUM OXIDE, MANGANESE GLUCONATE, CUPRIC OXIDE, SELENOMETHIONINE, AND ZINC OXIDE 2000; 30; 500; 20; 20; 100; 25; 150; 25; 1; 50; 50; 27; .1; 50; 5; 3; 50; 22.5 [IU]/1; [IU]/1; MG/1; MG/1; MG/1; MG/1; MG/1; UG/1; MG/1; MG/1; UG/1; MG/1; MG/1; MG/1; MG/1; MG/1; MG/1; UG/1; MG/1
1 TABLET, COATED ORAL DAILY
Qty: 30 TABLET | Refills: 5 | Status: SHIPPED | OUTPATIENT
Start: 2023-12-19

## 2023-12-19 NOTE — PROGRESS NOTES
Rosemary Arnold MD   [address] [place] [address]     Patient ID: [id number]     Chief Complaint: Follow up post ER visit for blood clot left leg (Complain of weakness.)        HPI:     Cody Agustin is a 73 y.o. male here today for an ER follow up . He went in with increased leg pain and swelling and a clot was found from left common femoral to the popliteal.  Today he reports weakness. He has been up going to the [facility] according to his daughter. He did not stay since the only bed would have been transfer to [place]. He agrees to sit still and not be moving like he has been.       Subjective:     Review of Systems   Constitutional:  Positive for malaise/fatigue.   Cardiovascular:  Positive for leg swelling.        Edema is better   Musculoskeletal:         Leg is not hurting now.    Neurological:  Positive for weakness.        Not just the leg, generalized       Past Medical History:   Diagnosis Date    Chronic renal failure     CVA (cerebral vascular accident)     Essential (primary) hypertension     HTN (hypertension)     Hypokalemia     Illiteracy     Illiterate     Left spastic hemiplegia     Medically noncompliant     Spastic hemiplegia of left nondominant side as late effect of cerebral infarction     Stage 3a chronic kidney disease         Past Surgical History:   Procedure Laterality Date    FOOT ARTHRODESIS W/ [place] ARTHROEREISIS IMPLANT Right 08/13/2015    FOOT REPLANTATION Right        Family History   Problem Relation Age of Onset    Stroke Mother     Heart attack Father     Diabetes Sister     Kidney failure Sister     Heart failure Brother     Hypertension Brother     Stroke Brother     Heart failure Brother     Heart failure Brother     Diabetes type II Sister     Kidney failure Sister     Colon cancer Sister     Osteoarthritis Sister     Stroke Brother     Heart failure Brother     Hypertension Brother     Heart failure Brother     Heart failure Brother     Heart failure Brother      "    Social History     Socioeconomic History    Marital status: Single   Tobacco Use    Smoking status: Never    Smokeless tobacco: Current     Types: Snuff   Substance and Sexual Activity    Alcohol use: Not Currently    Drug use: Never    Sexual activity: Not Currently   Social History Narrative    ** Merged History Encounter **            Review of patient's allergies indicates:  No Known Allergies    Outpatient Medications Marked as Taking for the 12/19/23 encounter (Office Visit) with Rosemary Arnold MD   Medication Sig Dispense Refill    albuterol (PROVENTIL HFA) 90 mcg/actuation inhaler Inhale 2 puffs into the lungs every 6 (six) hours as needed for Wheezing. Rescue 8 g 11    baclofen (LIORESAL) 10 MG tablet Take 1 tablet (10 mg total) by mouth 3 (three) times daily. 90 tablet 11    carvediloL (COREG) 12.5 MG tablet       gabapentin (NEURONTIN) 600 MG tablet Take 1 tablet (600 mg total) by mouth 3 (three) times daily. 90 tablet 5    hydroCHLOROthiazide (HYDRODIURIL) 25 MG tablet TAKE ONE TABLET BY MOUTH DAILY after getting home from work 30 tablet 1    montelukast (SINGULAIR) 10 mg tablet TAKE ONE TABLET BY MOUTH IN THE EVENING 30 tablet 2    potassium chloride 10% (KAYCIEL) 20 mEq/15 mL oral solution Take 15 mLs (20 mEq total) by mouth once daily. 473 mL 2    pravastatin (PRAVACHOL) 20 MG tablet TAKE ONE TABLET BY MOUTH DAILY 30 tablet 4    tamsulosin (FLOMAX) 0.4 mg Cap Take 1 capsule (0.4 mg total) by mouth once daily. 30 capsule 11    [DISCONTINUED] rivaroxaban (XARELTO) 15 mg Tab Take 1 tablet (15 mg total) by mouth 2 (two) times daily with meals. for 21 days 42 tablet 0       Patient Care Team:  Rosemary Arnold MD as PCP - General (Internal Medicine)  Rosemary Arnold MD (Internal Medicine)  Kristen Curtis LPN as Care Coordinator       Objective:     /64   Pulse (!) 55   Temp 97.3 °F (36.3 °C) (Oral)   Resp 16   Ht 5' 6" (1.676 m)   Wt 86.6 kg (191 lb)   SpO2 98%   BMI 30.83 kg/m² "     Physical Exam  Vitals reviewed.   Cardiovascular:      Rate and Rhythm: Normal rate and regular rhythm.   Pulmonary:      Effort: Pulmonary effort is normal.      Breath sounds: No wheezing.   Musculoskeletal:      Left lower leg: Edema present.   Skin:     General: Skin is warm and dry.   Neurological:      Mental Status: He is alert.   Psychiatric:      Comments: Not taking this serious               Assessment/Plan:     1. Acute deep vein thrombosis (DVT) of femoral vein of left lower extremity  Comments:  Improving on Xarelto  Orders:  -     Basic Metabolic Panel    2. Anticoagulated  Comments:  Will go to 20mg nightly, he may need to be anticoagulated indefinitely with the weakness in that leg.  Orders:  -     CBC Auto Differential    3. Weakness  Comments:  Will check CBC on blood thinner and recheck his kidney  Orders:  -     CBC Auto Differential  -     Basic Metabolic Panel    Other orders  -     Discontinue: rivaroxaban (XARELTO) 15 mg Tab; Take 1 tablet (15 mg total) by mouth 2 (two) times daily with meals.  Dispense: 60 tablet; Refill: 5  -     multivit, min no.20-iron-folic (BACMIN) 27 mg iron- 1 mg Tab; Take 1 tablet by mouth once daily.  Dispense: 30 tablet; Refill: 5  -     rivaroxaban (XARELTO) 20 mg Tab; Take 1 tablet (20 mg total) by mouth daily with dinner or evening meal.  Dispense: 30 tablet; Refill: 5             Follow up in about 6 weeks (around 1/30/2024). In addition to their scheduled follow up, the patient has also been instructed to follow up on as needed basis.     Signature:  Rosemary Arnold MD  [facility]  [address] [place] [address]

## 2023-12-20 NOTE — TELEPHONE ENCOUNTER
----- Message from Rosemary Arnold MD sent at 12/19/2023  5:33 PM CST -----  Potassium is better but still low, take an extra potassium pill with supper tonight (one time). Blood count and kidney looks stable.

## 2024-01-24 ENCOUNTER — TELEPHONE (OUTPATIENT)
Dept: PRIMARY CARE CLINIC | Facility: CLINIC | Age: 74
End: 2024-01-24
Payer: MEDICARE

## 2024-01-24 NOTE — TELEPHONE ENCOUNTER
PT CONFIRMED APPT     1. Are there any outstanding tasks in the patient's chart? (Ex. Labs, MMG)?-    2. Do we have any outstanding/Pending referrals?-    3. Has the patient been seen in an ER, Urgent Care or been admitted to the hospital since last office visit with our office?-    4. Has the patient seen any other health care provider (doctors) since last visit?-    5. Has the patient had any blood work or x-rays done since last visit?-    QUALITY-DO NOT ASK PATIENT    -PNEUMONIA  13:12/15/20  20:-  23:2/23/22    -COLON:7/23/19    -DEXA:-    -DIABETES SCREEN  A1C:-  MICRO UA:-  EYE EXAM:-  FOOT EXAM:-

## 2024-01-29 PROBLEM — I82.412 ACUTE DEEP VEIN THROMBOSIS (DVT) OF FEMORAL VEIN OF LEFT LOWER EXTREMITY: Status: ACTIVE | Noted: 2024-01-29

## 2024-01-29 PROBLEM — Z79.01 ANTICOAGULATED: Status: ACTIVE | Noted: 2024-01-29

## 2024-02-05 ENCOUNTER — TELEPHONE (OUTPATIENT)
Dept: PRIMARY CARE CLINIC | Facility: CLINIC | Age: 74
End: 2024-02-05
Payer: MEDICARE

## 2024-02-11 PROBLEM — I10 ESSENTIAL HYPERTENSION: Status: RESOLVED | Noted: 2022-05-23 | Resolved: 2024-02-11

## 2024-02-11 PROBLEM — N18.31 CHRONIC RENAL IMPAIRMENT, STAGE 3A: Status: RESOLVED | Noted: 2022-05-23 | Resolved: 2024-02-11

## 2024-02-14 ENCOUNTER — TELEPHONE (OUTPATIENT)
Dept: PRIMARY CARE CLINIC | Facility: CLINIC | Age: 74
End: 2024-02-14
Payer: MEDICARE

## 2024-03-19 ENCOUNTER — TELEPHONE (OUTPATIENT)
Dept: PRIMARY CARE CLINIC | Facility: CLINIC | Age: 74
End: 2024-03-19
Payer: MEDICARE

## 2024-03-19 RX ORDER — PENICILLIN V POTASSIUM 500 MG/1
500 TABLET, FILM COATED ORAL 4 TIMES DAILY
Qty: 40 TABLET | Refills: 0 | Status: SHIPPED | OUTPATIENT
Start: 2024-03-19 | End: 2024-03-29

## 2024-03-19 NOTE — TELEPHONE ENCOUNTER
He wouldn't have Medicaid, they are who puts them on [employer]. She might check at the [facility] in [facility] they usually have a dentist and may take that insurance.   I'll send an antibiotic It's Penicillin so stop if rash or itching.

## 2024-03-29 RX ORDER — AMLODIPINE BESYLATE 10 MG/1
10 TABLET ORAL
Qty: 30 TABLET | Refills: 5 | Status: SHIPPED | OUTPATIENT
Start: 2024-03-29

## 2024-05-14 ENCOUNTER — TELEPHONE (OUTPATIENT)
Dept: PRIMARY CARE CLINIC | Facility: CLINIC | Age: 74
End: 2024-05-14
Payer: MEDICARE

## 2024-05-14 NOTE — PROGRESS NOTES
Patient ID: [id number]     Chief Complaint: Medicare AWV (Complain of low energy and weakness.)      HPI:     Cody Agustin is a 74 y.o. male here today for a Medicare Wellness.  Has not been to any other doctors except occasionally goes to ER for his leg but not since last visit.       Opioid Screening: Patient medication list reviewed, patient is not taking prescription opioids. Patient is not using additional opioids than prescribed. Patient is at low risk of substance abuse based on this opioid use history.       Past Medical History:   Diagnosis Date    Chronic renal failure     CVA (cerebral vascular accident)     Essential (primary) hypertension     HTN (hypertension)     Hypokalemia     Illiteracy     Illiterate     Left spastic hemiplegia     Medically noncompliant     Spastic hemiplegia of left nondominant side as late effect of cerebral infarction     Stage 3a chronic kidney disease         Past Surgical History:   Procedure Laterality Date    FOOT ARTHRODESIS W/ [place] ARTHROEREISIS IMPLANT Right 08/13/2015    FOOT REPLANTATION Right        Review of patient's allergies indicates:  No Known Allergies    Outpatient Medications Marked as Taking for the 5/15/24 encounter (Office Visit) with Rosemary Arnold MD   Medication Sig Dispense Refill    albuterol (PROVENTIL HFA) 90 mcg/actuation inhaler Inhale 2 puffs into the lungs every 6 (six) hours as needed for Wheezing. Rescue 8 g 11    amLODIPine (NORVASC) 10 MG tablet Take 1 tablet (10 mg total) by mouth once daily. 30 tablet 5    baclofen (LIORESAL) 10 MG tablet Take 1 tablet (10 mg total) by mouth 3 (three) times daily. 90 tablet 11    carvediloL (COREG) 12.5 MG tablet       gabapentin (NEURONTIN) 600 MG tablet Take 1 tablet (600 mg total) by mouth 3 (three) times daily. 90 tablet 5    hydroCHLOROthiazide (HYDRODIURIL) 25 MG tablet TAKE ONE TABLET BY MOUTH DAILY after getting home from work 30 tablet 1    rivaroxaban (XARELTO) 20 mg Tab Take 1  tablet (20 mg total) by mouth daily with dinner or evening meal. 30 tablet 5    tamsulosin (FLOMAX) 0.4 mg Cap Take 1 capsule (0.4 mg total) by mouth once daily. 30 capsule 11       Social History     Socioeconomic History    Marital status: Single   Tobacco Use    Smoking status: Never    Smokeless tobacco: Current     Types: Snuff   Substance and Sexual Activity    Alcohol use: Not Currently    Drug use: Never    Sexual activity: Not Currently   Social History Narrative    ** Merged History Encounter **          Social Determinants of Health     Financial Resource Strain: Low Risk  (5/15/2024)    Overall Financial Resource Strain (CARDIA)     Difficulty of Paying Living Expenses: Not hard at all   Food Insecurity: No Food Insecurity (5/15/2024)    Hunger Vital Sign     Worried About Running Out of Food in the Last Year: Never true     Ran Out of Food in the Last Year: Never true   Physical Activity: Inactive (5/15/2024)    Exercise Vital Sign     Days of Exercise per Week: 0 days     Minutes of Exercise per Session: 0 min   Stress: No Stress Concern Present (5/15/2024)    [employer] - Occupational Stress Questionnaire     Feeling of Stress : Not at all   Housing Stability: Low Risk  (5/15/2024)    Housing Stability Vital Sign     Unable to Pay for Housing in the Last Year: No     Homeless in the Last Year: No        Family History   Problem Relation Name Age of Onset    Stroke Mother      Heart attack Father      Diabetes Sister      Kidney failure Sister      Heart failure Brother      Hypertension Brother      Stroke Brother      Heart failure Brother      Heart failure Brother      Diabetes type II Sister Ann     Kidney failure Sister Ann     Colon cancer Sister Ann     Osteoarthritis Sister Ann     Stroke Brother      Heart failure Brother      Hypertension Brother      Heart failure Brother      Heart failure Brother      Heart failure Brother          Patient Care  Team:  Rosemary Arnold MD as PCP - General (Internal Medicine)  Rosemary Arnold MD (Internal Medicine)  Kristen Curtis LPN as Care Coordinator       Subjective:     Review of Systems   Respiratory:  Positive for shortness of breath.         SOB with exertion   Cardiovascular:  Negative for chest pain, palpitations and leg swelling.   Genitourinary:  Positive for frequency.        He is drinking Sprite now instead of Cola, he's occasionally doing water but not often   Neurological:  Positive for focal weakness and weakness.        He ran out of potassium         Patient Reported Health Risk Assessment  What [age] 70-79  Are you male or female?: Male  During the past four weeks, how much have you been bothered by emotional problems such as feeling anxious, depressed, irritable, sad, or downhearted and blue?: Not at all  During the past five weeks, has your physical and/or emotional health limited your social activities with family, friends, neighbors, or groups?: Not at all  During the past four weeks, how much bodily pain have you generally had?: No pain  During the past four weeks, was someone available to help if you needed and wanted help?: Yes, as much as I wanted  During the past four weeks, what was the hardest physical activity you could do for at least two minutes?: Very light  Can you get to places out of walking distance without help?  (For example, can you travel alone on buses or taxis, or drive your own car?): Yes  Can you go shopping for groceries or clothes without someone's help?: Yes  Can you prepare your own meals?: No  Can you do your own housework without help?: No  Because of any health problems, do you need the help of another person with your personal care needs such as eating, bathing, dressing, or getting around the house?: Yes  Can you handle your own money without help?: Yes  During the past four weeks, how would you rate your health in general?: Fair  How have things been  "going for you during the past four weeks?: Good and bad parts about equal  Are you having difficulties driving your car?: No  Do you always fasten your seat belt when you are in a car?: Yes, usually  How often in the past four weeks have you been bothered by falling or dizzy when standing up?: Never  How often in the past four weeks have you been bothered by sexual problems?: Never  How often in the past four weeks have you been bothered by trouble eating well?: Never  How often in the past four weeks have you been bothered by teeth or denture problems?: Never  How often in the past four weeks have you been bothered with problems using the telephone?: Never  How often in the past four weeks have you been bothered by tiredness or fatigue?: Often  Have you fallen two or more times in the past year?: No  Are you afraid of falling?: Yes  Are you a smoker?: No  During the past four weeks, how many drinks of wine, beer, or other alcoholic beverages did you have?: No alcohol at all  Do you exercise for about 20 minutes three or more days a week?: No, I usually do not exercise this much  Have you been given any information to help you with hazards in your house that might hurt you?: No  Have you been given any information to help you with keeping track of your medications?: No  How often do you have trouble taking medicines the way you've been told to take them?: I always take them as prescribed  How confident are you that you can control and manage most of your health problems?: Not very confident  What is your race? (Check all that apply.):     Objective:     BP (!) 148/94   Pulse (!) 58   Temp 97.7 °F (36.5 °C) (Oral)   Resp 16   Ht 5' 6" (1.676 m)   Wt 85.7 kg (189 lb)   SpO2 98%   BMI 30.51 kg/m²     Physical Exam  Vitals reviewed.   Constitutional:       Appearance: He is ill-appearing.   HENT:      Head: Normocephalic and atraumatic.      Comments: Temporal wasting  Cardiovascular:      Rate " and Rhythm: Regular rhythm. Bradycardia present.   Pulmonary:      Effort: Pulmonary effort is normal.      Breath sounds: No wheezing, rhonchi or rales.   Abdominal:      General: Abdomen is flat.      Tenderness: There is no abdominal tenderness.   Musculoskeletal:      Right lower leg: Edema present.      Left lower leg: Edema present.      Comments: 2-3+ edema L>R   Neurological:      Mental Status: He is alert.                No data to display                  5/15/2024     9:00 AM 12/19/2023     2:40 PM 11/15/2023     8:40 AM 7/19/2023     9:40 AM 4/13/2023     9:00 AM 8/24/2022     4:30 PM 5/23/2022     3:00 PM   Fall Risk Assessment - Outpatient   Mobility Status Ambulatory w/ assistance Ambulatory w/ assistance Ambulatory w/ assistance Ambulatory w/ assistance Ambulatory w/ assistance Ambulatory w/ assistance Ambulatory w/ assistance   Number of falls 0 0 0 0 0 1 1   Identified as fall risk True True True True True True True           Depression Screening  Over the past two weeks, has the patient felt down, depressed, or hopeless?: No  Over the past two weeks, has the patient felt little interest or pleasure in doing things?: No  Functional Ability/Safety Screening  Was the patient's timed Up & Go test unsteady or longer than 30 seconds?: Yes  Does the patient need help with phone, transportation, shopping, preparing meals, housework, laundry, meds, or managing money?: No  Does the patient's home have rugs in the hallway, lack grab bars in the bathroom, lack handrails on the stairs or have poor lighting?: No  Have you noticed any hearing difficulties?: No  Cognitive Function (Assessed through direct observation with due consideration of information obtained by way of patient reports and/or concerns raised by family, friends, caretakers, or others)    Does the patient repeat questions/statements in the same day?: No  Does the patient have trouble remembering the date, year, and time?: No  Does the patient  have difficulty managing finances?: No  Assessment/Plan:     1. Medicare annual wellness visit, subsequent    2. Spastic hemiparesis of left nondominant side as late effect of cerebral infarction    3. Essential hypertension  Comments:  Elevated again, poor compliance with treatment  Orders:  -     Comprehensive Metabolic Panel  -     Lipid Panel  -     EKG 12-lead; Future; Expected date: 06/15/2024  -     X-Ray Chest PA And Lateral; Future; Expected date: 05/15/2024    4. Stage 3a chronic kidney disease  Comments:  Need lab, avoid NSAID although his understanding of this is limited due to literacy  Orders:  -     CBC Auto Differential  -     Comprehensive Metabolic Panel  -     Microalbumin/Creatinine Ratio, Urine    5. Hypokalemia  Comments:  Ran out of his medication, only likes the liquid, will try to reorder  Orders:  -     Comprehensive Metabolic Panel    6. Acute deep vein thrombosis (DVT) of femoral vein of left lower extremity  Comments:  Anticoagulated, due to stroke his is high risk for recurrence, continue as long as tolerated    7. Anticoagulated  Comments:  Recheck CBC today  Orders:  -     CBC Auto Differential    8. Abnormal weight loss  Comments:  Part was fluid from leg now veins are opening some but it's concerning, if more anemic will try again to get him to GI  Orders:  -     TSH    9. Noncompliance with treatment regimen  Comments:  Appears to be taking Baclofen out of three bottles, banded together so he will only take one from them.    10. Illiteracy  Comments:  Cannot read, still has functioned well even with his disabilities after the stroke which worsed his communication    11. Chews tobacco    12. Hypochromic anemia  Comments:  Will check lab, if lower to GI although he's refused in past  Orders:  -     Iron and TIBC    13. Dyspnea on exertion  Comments:  Check CXR and EKG it doesn't sound like he's kept follow up with cardiology  Orders:  -     EKG 12-lead; Future; Expected date:  06/15/2024  -     X-Ray Chest PA And Lateral; Future; Expected date: 05/15/2024    14. Protein-calorie malnutrition, moderate  Comments:  Down from 191 a year ago, part of all his weights has been fluid retention which is still significant, he's lost muscle in his temples    15. 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  Dispense: 473 mL; Refill: 11           Medicare Annual Wellness and Personalized Prevention Plan:   Fall Risk + Home Safety + Hearing Impairment + Depression Screen + Opioid and Substance Abuse Screening + Cognitive Impairment Screen + Health Risk Assessment all reviewed.     Health Maintenance Topics with due status: Not Due       Topic Last Completion Date    Influenza Vaccine 12/08/2021    Lipid Panel 07/19/2023      The patient's Health Maintenance was reviewed and the following appears to be due at this time:   Health Maintenance Due   Topic Date Due    Annual UACr  Never done    TETANUS VACCINE  Never done    High Dose Statin  Never done    Shingles Vaccine (1 of 2) Never done    RSV Vaccine (Age 60+ and Pregnant patients) (1 - 1-dose 60+ series) Never done    Abdominal Aortic Aneurysm Screening  Never done    Colorectal Cancer Screening  07/23/2022    COVID-19 Vaccine (4 - 2023-24 season) 09/01/2023       Advance Care Planning   I attest to discussing Advance Care Planning with patient and/or family member.  Education was provided including the importance of the Health Care Power of , Advance Directives, and/or [place] documentation.  The patient expressed understanding to the importance of this information and discussion.   Advance Care Planning     Date: 05/15/2024    Living Will  During this visit, I engaged the patient  in the voluntary  advance care planning process.  The patient and I reviewed the role for advance directives and their purpose in directing future healthcare if the patient's unable to speak for him/herself.  At this point in time, the patient does have full decision-making capacity.  We discussed different extreme health states that he could experience, and reviewed what kind of medical care he would want in those situations.  The patient communicated that if he were comatose and had little chance of a meaningful recovery, he would not want machines/life-sustaining treatments used. In addition to the above preference, other important end-of-life issues for the patient include  Literacy is not good, he doesn't want anything invasive done .  I spent a total of 5 minutes engaging the patient in this advance care planning discussion.                 Follow up in about 3 months (around 8/15/2024) for uncontrolled HTN. In addition to their scheduled follow up, the patient has also been instructed to follow up on as needed basis.

## 2024-05-15 ENCOUNTER — OFFICE VISIT (OUTPATIENT)
Dept: PRIMARY CARE CLINIC | Facility: CLINIC | Age: 74
End: 2024-05-15
Payer: MEDICARE

## 2024-05-15 VITALS
SYSTOLIC BLOOD PRESSURE: 148 MMHG | BODY MASS INDEX: 30.37 KG/M2 | TEMPERATURE: 98 F | DIASTOLIC BLOOD PRESSURE: 94 MMHG | HEIGHT: 66 IN | OXYGEN SATURATION: 98 % | HEART RATE: 58 BPM | WEIGHT: 189 LBS | RESPIRATION RATE: 16 BRPM

## 2024-05-15 DIAGNOSIS — E87.6 HYPOKALEMIA: ICD-10-CM

## 2024-05-15 DIAGNOSIS — I69.922 DYSARTHRIA AS LATE EFFECT OF CEREBROVASCULAR DISEASE: ICD-10-CM

## 2024-05-15 DIAGNOSIS — R63.4 ABNORMAL WEIGHT LOSS: ICD-10-CM

## 2024-05-15 DIAGNOSIS — N18.31 STAGE 3A CHRONIC KIDNEY DISEASE: ICD-10-CM

## 2024-05-15 DIAGNOSIS — Z91.199 NONCOMPLIANCE WITH TREATMENT REGIMEN: ICD-10-CM

## 2024-05-15 DIAGNOSIS — I82.412 ACUTE DEEP VEIN THROMBOSIS (DVT) OF FEMORAL VEIN OF LEFT LOWER EXTREMITY: ICD-10-CM

## 2024-05-15 DIAGNOSIS — D50.9 HYPOCHROMIC ANEMIA: ICD-10-CM

## 2024-05-15 DIAGNOSIS — I69.354 SPASTIC HEMIPARESIS OF LEFT NONDOMINANT SIDE AS LATE EFFECT OF CEREBRAL INFARCTION: ICD-10-CM

## 2024-05-15 DIAGNOSIS — I10 ESSENTIAL HYPERTENSION: ICD-10-CM

## 2024-05-15 DIAGNOSIS — Z00.00 MEDICARE ANNUAL WELLNESS VISIT, SUBSEQUENT: Primary | ICD-10-CM

## 2024-05-15 DIAGNOSIS — Z72.0 CHEWS TOBACCO: ICD-10-CM

## 2024-05-15 DIAGNOSIS — R06.09 DYSPNEA ON EXERTION: ICD-10-CM

## 2024-05-15 DIAGNOSIS — E44.0 PROTEIN-CALORIE MALNUTRITION, MODERATE: ICD-10-CM

## 2024-05-15 DIAGNOSIS — Z55.0 ILLITERACY: ICD-10-CM

## 2024-05-15 DIAGNOSIS — Z79.01 ANTICOAGULATED: ICD-10-CM

## 2024-05-15 PROBLEM — E66.01 SEVERE OBESITY (BMI 35.0-39.9) WITH COMORBIDITY: Status: RESOLVED | Noted: 2023-07-19 | Resolved: 2024-05-15

## 2024-05-15 LAB
ALBUMIN SERPL-MCNC: 3.9 G/DL (ref 3.8–4.8)
ALBUMIN/GLOB SERPL: 1 {RATIO} (ref 1.2–2.2)
ALP SERPL-CCNC: 73 IU/L (ref 44–121)
ALT SERPL-CCNC: 15 IU/L (ref 0–44)
AST SERPL-CCNC: 23 IU/L (ref 0–40)
BASOPHILS # BLD AUTO: 0.1 X10E3/UL (ref 0–0.2)
BASOPHILS NFR BLD AUTO: 1 %
BILIRUB SERPL-MCNC: 0.3 MG/DL (ref 0–1.2)
BUN SERPL-MCNC: 27 MG/DL (ref 8–27)
BUN/CREAT SERPL: 17 (ref 10–24)
CALCIUM SERPL-MCNC: 9.2 MG/DL (ref 8.6–10.2)
CHLORIDE SERPL-SCNC: 102 MMOL/L (ref 96–106)
CHOLEST SERPL-MCNC: 199 MG/DL (ref 100–199)
CO2 SERPL-SCNC: 23 MMOL/L (ref 20–29)
CREAT SERPL-MCNC: 1.62 MG/DL (ref 0.76–1.27)
EOSINOPHIL # BLD AUTO: 0.2 X10E3/UL (ref 0–0.4)
EOSINOPHIL NFR BLD AUTO: 4 %
ERYTHROCYTE [DISTWIDTH] IN BLOOD BY AUTOMATED COUNT: 14.8 % (ref 11.6–15.4)
EST. GFR  (NO RACE VARIABLE): 44 ML/MIN/1.73
GLOBULIN SER CALC-MCNC: 3.8 G/DL (ref 1.5–4.5)
GLUCOSE SERPL-MCNC: 93 MG/DL (ref 70–99)
HCT VFR BLD AUTO: 36.4 % (ref 37.5–51)
HDLC SERPL-MCNC: 49 MG/DL
HGB BLD-MCNC: 12.2 G/DL (ref 13–17.7)
IRON SATN MFR SERPL: 26 % (ref 15–55)
IRON SERPL-MCNC: 77 UG/DL (ref 38–169)
LDLC SERPL CALC-MCNC: 137 MG/DL (ref 0–99)
LYMPHOCYTES # BLD AUTO: 1.7 X10E3/UL (ref 0.7–3.1)
LYMPHOCYTES NFR BLD AUTO: 30 %
MCH RBC QN AUTO: 29.8 PG (ref 26.6–33)
MCHC RBC AUTO-ENTMCNC: 33.5 G/DL (ref 31.5–35.7)
MCV RBC AUTO: 89 FL (ref 79–97)
MONOCYTES # BLD AUTO: 0.5 X10E3/UL (ref 0.1–0.9)
MONOCYTES NFR BLD AUTO: 8 %
MORPHOLOGY BLD-IMP: ABNORMAL
NEUTROPHILS # BLD AUTO: 3.2 X10E3/UL (ref 1.4–7)
NEUTROPHILS NFR BLD AUTO: 57 %
PLATELET # BLD AUTO: 194 X10E3/UL (ref 150–450)
POTASSIUM SERPL-SCNC: 3.1 MMOL/L (ref 3.5–5.2)
PROT SERPL-MCNC: 7.7 G/DL (ref 6–8.5)
RBC # BLD AUTO: 4.09 X10E6/UL (ref 4.14–5.8)
SODIUM SERPL-SCNC: 141 MMOL/L (ref 134–144)
TIBC SERPL-MCNC: 301 UG/DL (ref 250–450)
TRIGL SERPL-MCNC: 69 MG/DL (ref 0–149)
TSH SERPL DL<=0.005 MIU/L-ACNC: 2.67 UIU/ML (ref 0.45–4.5)
UIBC SERPL-MCNC: 224 UG/DL (ref 111–343)
VLDLC SERPL CALC-MCNC: 13 MG/DL (ref 5–40)
WBC # BLD AUTO: 5.6 X10E3/UL (ref 3.4–10.8)

## 2024-05-15 PROCEDURE — 3079F DIAST BP 80-89 MM HG: CPT | Mod: CPTII,,, | Performed by: INTERNAL MEDICINE

## 2024-05-15 PROCEDURE — 1158F ADVNC CARE PLAN TLK DOCD: CPT | Mod: CPTII,,, | Performed by: INTERNAL MEDICINE

## 2024-05-15 PROCEDURE — 3288F FALL RISK ASSESSMENT DOCD: CPT | Mod: CPTII,,, | Performed by: INTERNAL MEDICINE

## 2024-05-15 PROCEDURE — G0439 PPPS, SUBSEQ VISIT: HCPCS | Mod: ,,, | Performed by: INTERNAL MEDICINE

## 2024-05-15 PROCEDURE — 1101F PT FALLS ASSESS-DOCD LE1/YR: CPT | Mod: CPTII,,, | Performed by: INTERNAL MEDICINE

## 2024-05-15 PROCEDURE — 1160F RVW MEDS BY RX/DR IN RCRD: CPT | Mod: CPTII,,, | Performed by: INTERNAL MEDICINE

## 2024-05-15 PROCEDURE — 1159F MED LIST DOCD IN RCRD: CPT | Mod: CPTII,,, | Performed by: INTERNAL MEDICINE

## 2024-05-15 PROCEDURE — 3077F SYST BP >= 140 MM HG: CPT | Mod: CPTII,,, | Performed by: INTERNAL MEDICINE

## 2024-05-15 PROCEDURE — 1126F AMNT PAIN NOTED NONE PRSNT: CPT | Mod: CPTII,,, | Performed by: INTERNAL MEDICINE

## 2024-05-15 RX ORDER — POTASSIUM CHLORIDE 20 MEQ/15ML
20 SOLUTION ORAL 2 TIMES DAILY
Qty: 473 ML | Refills: 11 | Status: SHIPPED | OUTPATIENT
Start: 2024-05-15

## 2024-05-15 SDOH — SOCIAL DETERMINANTS OF HEALTH (SDOH): ILITERACY AND LOW LEVEL LITERACY: Z55.0

## 2024-05-15 NOTE — PATIENT INSTRUCTIONS
Mario Ross,     If you are due for any health screening(s) below please notify me so we can arrange them to be ordered and scheduled. Most healthy patients at your age complete them, but you are free to accept or refuse.     If you can't do it, I'll definitely understand. If you can, I'd certainly appreciate it!    Tests to Keep You Healthy    Colon Cancer Screening: DUE  Last Blood Pressure <= 139/89 (5/15/2024): Yes      Its time for your colon cancer screening     Colorectal cancer is one of the leading causes of cancer death for men and women but it doesnt have to be. Screenings can prevent colorectal cancer or find it early enough to treat and cure the disease.     Our records indicate that you may be overdue for colon cancer screening. A colonoscopy or stool screening test can help identify patients at risk for developing colon cancer. Cancer screenings save lives, so schedule yours today to stay healthy.     A colonoscopy is the preferred test for detecting colon cancer. It is needed only once every 10 years if results are negative. While you are sedated, a flexible, lighted tube with a tiny camera is inserted into the rectum and advanced through the colon to look for cancers.     An alternative screening test that is used at home and returned to the lab may also be used. It detects hidden blood in bowel movements which could indicate cancer in the colon. If results are positive, you will need a colonoscopy to determine if the blood is a sign of cancer. This type of follow up (diagnostic) colonoscopy usually requires additional copays as required by your insurance provider.     If you recently had your colon cancer screening performed outside of [facility], please let your Health care team know so that they can update your health record. Please contact your PCP if you have any questions.    Lets manage your high blood pressure     Your blood pressure was above 140/90 today during your visit. We  recommend that you schedule a nurse visit in two weeks to check your blood pressure and discuss ways to support your health goals.     You can also manage your health and record your blood pressure from the comfort of home by keeping a daily blood pressure log. These results are shared with and reviewed by your provider. Please print this form (Daily Blood Pressure Log) to assist you in keeping track of your blood pressure at home.     Schedule your nurse visit in two weeks to learn more about how to track and manage high blood pressure.    Daily Blood Pressure Log    Name:__________________________________                  Date of Birth:_________    Average Blood Pressure:  __________      Date: Time  (a.m.) Blood  Pressure: Pulse  Rate: Time  (p.m.) Blood  Pressure : Pulse  Rate:   Sample 8:37 127/83 84                                                                                                                                                                                    5-10 year preventative plan discussed.

## 2024-05-16 ENCOUNTER — TELEPHONE (OUTPATIENT)
Dept: PRIMARY CARE CLINIC | Facility: CLINIC | Age: 74
End: 2024-05-16
Payer: MEDICARE

## 2024-05-16 LAB
ALBUMIN/CREAT UR: NORMAL MG/G CREAT
CREAT UR-MCNC: 116.4 MG/DL
MICROALBUMIN UR-MCNC: <12 UG/ML

## 2024-05-16 NOTE — TELEPHONE ENCOUNTER
----- Message from Rosemary Arnold MD sent at 5/16/2024  8:29 AM CDT -----  His blood count and iron are better. His bad cholesterol is high. Can he watch fat in diet or do I need to add a cholesterol medication? His kidney function is where it usually runs, he was hydrating better last time though. His potassium is low but he was out. Thyroid is ok.

## 2024-05-16 NOTE — TELEPHONE ENCOUNTER
Result given to daughter voices understanding states he eats meat gravy everyday will cut back and drink more water.